# Patient Record
Sex: FEMALE | HISPANIC OR LATINO | Employment: FULL TIME | URBAN - METROPOLITAN AREA
[De-identification: names, ages, dates, MRNs, and addresses within clinical notes are randomized per-mention and may not be internally consistent; named-entity substitution may affect disease eponyms.]

---

## 2017-01-24 ENCOUNTER — HOSPITAL ENCOUNTER (OUTPATIENT)
Dept: RADIOLOGY | Facility: MEDICAL CENTER | Age: 47
End: 2017-01-24
Attending: CLINICAL NURSE SPECIALIST
Payer: COMMERCIAL

## 2017-01-24 DIAGNOSIS — Z12.31 SCREENING MAMMOGRAM, ENCOUNTER FOR: ICD-10-CM

## 2017-01-24 PROCEDURE — G0202 SCR MAMMO BI INCL CAD: HCPCS

## 2018-02-08 ENCOUNTER — OFFICE VISIT (OUTPATIENT)
Dept: INTERNAL MEDICINE | Facility: MEDICAL CENTER | Age: 48
End: 2018-02-08
Payer: COMMERCIAL

## 2018-02-08 VITALS
HEIGHT: 62 IN | DIASTOLIC BLOOD PRESSURE: 100 MMHG | HEART RATE: 67 BPM | TEMPERATURE: 98.1 F | OXYGEN SATURATION: 96 % | SYSTOLIC BLOOD PRESSURE: 157 MMHG | BODY MASS INDEX: 28.52 KG/M2 | WEIGHT: 155 LBS

## 2018-02-08 DIAGNOSIS — R03.0 ELEVATED BLOOD PRESSURE READING: ICD-10-CM

## 2018-02-08 DIAGNOSIS — M25.562 LEFT KNEE PAIN, UNSPECIFIED CHRONICITY: ICD-10-CM

## 2018-02-08 PROCEDURE — 99203 OFFICE O/P NEW LOW 30 MIN: CPT | Mod: GE | Performed by: INTERNAL MEDICINE

## 2018-02-08 ASSESSMENT — PATIENT HEALTH QUESTIONNAIRE - PHQ9: CLINICAL INTERPRETATION OF PHQ2 SCORE: 0

## 2018-02-08 NOTE — PROGRESS NOTES
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 Her chief complaint is a pain in the left knee for more than one month she did not notice any obvious trauma to her knee joint however she endorses to some muscle pull happened during cleaning work.    She does not complain of any swelling of the joints nor any fever, chills. Pain does not radiate down to the extremity rather it goes up to the thighs on the posterior aspect.    She endorses  history of transient hypertension in 2011 and her primary care doctor discontinued the medication as her blood pressure remained normal for several months. However today in the clinic her blood pressure is elevated. She declined any history of headache, blurry vision, weakness of body, syncopal attacks, chest pain, shortness of breath.    She is  and has 4 children lives by herself. She is a nonsmoker and nonalcoholic she does not abuse any drugs    Patient Active Problem List    Diagnosis Date Noted   • Left knee pain 02/08/2018   • Elevated blood pressure reading 02/08/2018   • HTN (hypertension)    • Dyslipidemia        Past Medical History:   Diagnosis Date   • Dyslipidemia    • HTN    • Migraine with visual aura        Current Outpatient Prescriptions   Medication Sig Dispense Refill   • amlodipine (NORVASC) 10 MG TABS Take 1 Tab by mouth every day. 90 Each 1   • ASPIRIN-CAFFEINE-BUTALBITAL (BUTALBITAL COMPOUND/ASA) -40 MG CAPS Take 1 Cap by mouth every four hours as needed for Mild Pain. 25 Each 0   • rizatriptan (MAXALT-MLT) 10 MG disintegrating tablet Take 1 Tab by mouth Once PRN for Migraine for 1 dose. 1 Each 0     No current facility-administered medications for this visit.        Allergies as of 02/08/2018 - Reviewed 02/08/2018   Allergen Reaction  "Noted   • Pcn [penicillins]  06/18/2009       Social History     Social History   • Marital status:      Spouse name: N/A   • Number of children: 4   • Years of education: 9th grade     Occupational History   • House Keeping      Owns houskeeping     Social History Main Topics   • Smoking status: Never Smoker   • Smokeless tobacco: Never Used   • Alcohol use No   • Drug use: No   • Sexual activity: Yes     Partners: Male     Other Topics Concern   • Seat Belt Yes     Social History Narrative   • No narrative on file       Family History   Problem Relation Age of Onset   • Cancer Mother      uterine       Past Surgical History:   Procedure Laterality Date   • CHOLECYSTECTOMY     • TUBAL COAGULATION LAPAROSCOPIC BILATERAL         ROS: As per HPI. Additional pertinent symptoms as noted below.  Constitutional: Negative for fever, chills, positive for malaise/fatigue.   HENT: Negative for ear pain, nosebleeds, congestion, sore throat and neck pain.    Eyes: Negative for blurred vision   Respiratory: Negative for cough, sputum production,and wheezing.    Cardiovascular: Negative for chest pain, palpitations, orthopnea but  positive for leg swelling.   Gastrointestinal: Negative for heartburn, nausea, vomiting and abdominal pain.   Genitourinary: Negative for dysuria,  Musculoskeletal: Negative for myalgias, and back pain. ,  Positive for knee joint pain,  Skin: Negative for rash and itching.   Neurological: Negative for dizziness,  focal weakness and headaches.   Positive for tingling, sensory change in feet,  Endo/Heme/Allergies: Does not bruise/bleed easily.   Psychiatric/Behavioral: Negative for depression, suicidal ideas and memory loss      /100   Pulse 67   Temp 36.7 °C (98.1 °F)   Ht 1.575 m (5' 2\")   Wt 70.3 kg (155 lb)   SpO2 96%   BMI 28.35 kg/m²     Physical Exam  General:  Alert and oriented, No apparent distress.    Eyes: Pupils equal and reactive. No scleral icterus.    Throat: Clear no " erythema or exudates noted.    Neck: Supple. No lymphadenopathy noted. Thyroid not enlarged.    Lungs: Clear to auscultation and percussion bilaterally.    Cardiovascular: Regular rate and rhythm. No murmurs, rubs or gallops.    Abdomen:  Benign. No rebound or guarding noted.    Extremities: No clubbing, cyanosis, edema.    Skin: Clear. No rash or suspicious skin lesions noted.    Other:       Assessment and Plan    1. Left knee pain, unspecified chronicity  History of pain in the left knee joint for more than one month  Relieves on applying Voltaren gel  Patient requesting for orthopedic referral as she has been continuously using Voltaren gel  No obvious swelling or deformity of the knee joint nor any signs of effusion visible  However there is definite tenderness over deep palpation of the tendon of vastus lateralis    Orthopedic referral given    2. Elevated blood pressure reading  Patient's blood pressure was initially 134/90 subsequent recording was 157/100 with a pulse of 86/m  Clinically no signs of target organ damage  No abdominal bruit  No carotid bruit  Pulses normal  Heart sounds normal lung bases clear    Plan   we will obtain CBC CMP lipid profile urine microalbumin creatinine ratio  Patient is advised to keep a blood pressure log of morning evening recordings daily for 2 weeks and   come back and see us if blood pressure remains elevated we will start antihypertensive medications.   Patient was reluctant to start medications today  she assures she will bring the record        Risk Assessment (discuss potential complications a function of chronic problems): as above    Complexity (discuss number of co-morbidities): as above    Signed by: Leeann An M.D.

## 2018-02-14 DIAGNOSIS — M25.562 LEFT KNEE PAIN, UNSPECIFIED CHRONICITY: ICD-10-CM

## 2018-02-14 DIAGNOSIS — R03.0 ELEVATED BLOOD PRESSURE READING: ICD-10-CM

## 2018-02-22 ENCOUNTER — OFFICE VISIT (OUTPATIENT)
Dept: INTERNAL MEDICINE | Facility: MEDICAL CENTER | Age: 48
End: 2018-02-22
Payer: COMMERCIAL

## 2018-02-22 VITALS
BODY MASS INDEX: 28.37 KG/M2 | HEIGHT: 62 IN | SYSTOLIC BLOOD PRESSURE: 158 MMHG | DIASTOLIC BLOOD PRESSURE: 102 MMHG | OXYGEN SATURATION: 94 % | HEART RATE: 60 BPM | WEIGHT: 154.2 LBS | TEMPERATURE: 97.6 F

## 2018-02-22 DIAGNOSIS — B08.1 MOLLUSCUM CONTAGIOSUM: ICD-10-CM

## 2018-02-22 DIAGNOSIS — M25.562 LEFT KNEE PAIN, UNSPECIFIED CHRONICITY: ICD-10-CM

## 2018-02-22 DIAGNOSIS — I10 ESSENTIAL HYPERTENSION: ICD-10-CM

## 2018-02-22 DIAGNOSIS — E78.5 DYSLIPIDEMIA: ICD-10-CM

## 2018-02-22 DIAGNOSIS — E66.3 OVERWEIGHT (BMI 25.0-29.9): ICD-10-CM

## 2018-02-22 PROCEDURE — 99213 OFFICE O/P EST LOW 20 MIN: CPT | Mod: GE | Performed by: INTERNAL MEDICINE

## 2018-02-22 PROCEDURE — 93000 ELECTROCARDIOGRAM COMPLETE: CPT | Performed by: INTERNAL MEDICINE

## 2018-02-22 RX ORDER — IBUPROFEN 200 MG
200 TABLET ORAL EVERY 6 HOURS PRN
COMMUNITY
End: 2018-02-23

## 2018-02-22 RX ORDER — LISINOPRIL 10 MG/1
10 TABLET ORAL DAILY
Qty: 30 TAB | Refills: 1 | Status: SHIPPED | OUTPATIENT
Start: 2018-02-22 | End: 2018-04-17 | Stop reason: SDUPTHER

## 2018-02-22 ASSESSMENT — PAIN SCALES - GENERAL: PAINLEVEL: 4=SLIGHT-MODERATE PAIN

## 2018-02-23 RX ORDER — IMIQUIMOD 12.5 MG/.25G
CREAM TOPICAL
Qty: 1 EACH | Refills: 3 | Status: SHIPPED | OUTPATIENT
Start: 2018-02-23 | End: 2019-03-26

## 2018-02-23 NOTE — PROGRESS NOTES
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    Today she has brought her blood pressure log which is substantially high, which has ranged from 140/92 170/106, and blood pressure in the clinic also is high at 158/102    She had her cortisone shots for her left knee pain by  the orthopedic surgeon, however her pain is still present she was told by the orthopedic surgeon it will take some time after the steroid shots to be active.        Patient Active Problem List    Diagnosis Date Noted   • Molluscum contagiosum 02/22/2018   • Left knee pain 02/08/2018   • Elevated blood pressure reading 02/08/2018   • HTN (hypertension)    • Dyslipidemia        Current Outpatient Prescriptions   Medication Sig Dispense Refill   • ibuprofen (MOTRIN) 200 MG Tab Take 200 mg by mouth every 6 hours as needed.     • lisinopril (PRINIVIL) 10 MG Tab Take 1 Tab by mouth every day. 30 Tab 1   • amlodipine (NORVASC) 10 MG TABS Take 1 Tab by mouth every day. 90 Each 1   • ASPIRIN-CAFFEINE-BUTALBITAL (BUTALBITAL COMPOUND/ASA) -40 MG CAPS Take 1 Cap by mouth every four hours as needed for Mild Pain. 25 Each 0   • rizatriptan (MAXALT-MLT) 10 MG disintegrating tablet Take 1 Tab by mouth Once PRN for Migraine for 1 dose. 1 Each 0     No current facility-administered medications for this visit.        ROS: As per HPI. Additional pertinent  "symptoms as noted below.    Positive for dull aching headache  Negative for blurry vision  Negative for stroke  Negative for syncopal episodes  Negative for chest pain  Negative for shortness of breath  Negative for swelling of extremities      /102   Pulse 60   Temp 36.4 °C (97.6 °F)   Ht 1.575 m (5' 2\")   Wt 69.9 kg (154 lb 3.2 oz)   SpO2 94%   Breastfeeding? No   BMI 28.20 kg/m²     Physical Exam   Constitutional:  oriented to person, place, and time. No distress.   Eyes: Pupils are equal, round, and reactive to light. No scleral icterus.  Neck: Neck supple. No thyromegaly present. No carotid bruit   Cardiovascular: Normal rate, regular rhythm and normal heart sounds.  Exam reveals no gallop and no friction rub.  No murmur heard.  Pulmonary/Chest: Breath sounds normal. Chest wall is not dull to percussion.   Musculoskeletal:   no edema.  Dorsalis pedis pulses normal   No pitting edema  Lymphadenopathy: no cervical adenopathy  Abdomen soft nontender no abdominal bruit heard  Neurological: alert and oriented to person, place, and time.   Skin: No cyanosis. Nails show no clubbing.  Other: Normal mood and affect normal      Assessment and Plan    1. Essential hypertension  Patient's blood pressure consistently higher range  Peripheral pulses normal  No abdominal bruit  No Carotid bruit  Heart and lung exam normal  Funduscopy no significant retinopathy changes  EKG shows;  Normal sinus rhythm;   heart rate 62  MN interval normal  QRS duration normal  QTC normal  No evidence of left ventricular hypertrophy  No ischemic changes  No Q waves    Lab work done at [facility] on 2/12/2018 shows;     Random urine excretion of microalbumin 125.4 and random creatinine 133 mg per DL  microalbumin creatinine ratio very high at 943  Chem panel shows   BUN/creatinine 22/0.76 GFR 93,   sodium 135, potassium 4.6, chlorides 102, carbon dioxide 26,   calcium 7.1,   total protein 7.1, albumin 4.1, globulin 3.0, " albumin globulin ratio 1.4, bilirubin total 0.5, alkaline phosphatase 60, AST 58, ALT 39  WBC count 7.4K , hemoglobin and hematocrit 15.9/50.4    Plan  Start with lisinopril 10 mg, ( although she is in reproductive age group, she has had tubal ligation, has 4 kids, and not planning for any conception )  Patient advised to keep blood pressure recordings after starting the treatment and bring it in 2 weeks  She is advised to cut down her diet,    Follow the DASH diet   reduce her weight and make lifestyle modifications    2. Dyslipidemia    Lab Results   Component Value Date/Time    CHOLSTRLTOT 220 (H) 11/08/2011 08:00 AM     11/08/2011 08:00 AM    HDL 48 11/08/2011 08:00 AM    TRIGLYCERIDE 65 11/08/2011 08:00 AM       Lab Results   Component Value Date/Time    GLUCOSE 87 11/08/2011 08:00 AM    CREATININE 0.64 11/08/2011 08:00 AM     No results found for: ALKPHOSPHAT, ASTSGOT, ALTSGPT, TBILIRUBIN       Repeat lipid panel done at [facility] on 2/12/2018  Shows total cholesterol 225, HDL 60, triglycerides 103,     Plan  Patient advised to control her diet and do regular exercise and weight reduction  Lifestyle modification  Patient verbalizes understanding and will try first by diet control before she agrees for starting Statin therapy    3. Molluscum contagiosum  Patient has a skin lesion over the left infra-clavicular region which appears to be molluscum contagiosum which has been chronically irritated by her Bra . She had consulted her dermatologist who advised to follow-up with her PCP if the lesion do not go away.    Plan  Will give trial of Aldara cream (Imiquimod 5 %)  If no response will consider doing liquid nitrogen freeze cautery    4. 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 She has received cortisone shot by her orthopedic surgeon.  Anticipating pain relief.    5. Overweight (BMI  25.0-29.9)  Patient weight is in overweight category  Nutritional referral  given, as patient is interested in reducing her weight and control her cholesterol and also DASH diet for hypertension      Signed by: Leenan An M.D.

## 2018-02-23 NOTE — PATIENT INSTRUCTIONS
"DASH Eating Plan  DASH stands for \"Dietary Approaches to Stop Hypertension.\" The DASH eating plan is a healthy eating plan that has been shown to reduce high blood pressure (hypertension). Additional health benefits may include reducing the risk of type 2 diabetes mellitus, heart disease, and stroke. The DASH eating plan may also help with weight loss.  WHAT DO I NEED TO KNOW ABOUT THE DASH EATING PLAN?  For the DASH eating plan, you will follow these general guidelines:  · Choose foods with a percent daily value for sodium of less than 5% (as listed on the food label).  · Use salt-free seasonings or herbs instead of table salt or sea salt.  · Check with your health care provider or pharmacist before using salt substitutes.  · Eat lower-sodium products, often labeled as \"lower sodium\" or \"no salt added.\"  · Eat fresh foods.  · Eat more vegetables, fruits, and low-fat dairy products.  · Choose whole grains. Look for the word \"whole\" as the first word in the ingredient list.  · Choose fish and skinless chicken or turkey more often than red meat. Limit fish, poultry, and meat to 6 oz (170 g) each day.  · Limit sweets, desserts, sugars, and sugary drinks.  · Choose heart-healthy fats.  · Limit cheese to 1 oz (28 g) per day.  · Eat more home-cooked food and less restaurant, buffet, and fast food.  · Limit fried foods.  · Cook foods using methods other than frying.  · Limit canned vegetables. If you do use them, rinse them well to decrease the sodium.  · When eating at a restaurant, ask that your food be prepared with less salt, or no salt if possible.  WHAT FOODS CAN I EAT?  Seek help from a dietitian for individual calorie needs.  Grains  Whole grain or whole wheat bread. Brown rice. Whole grain or whole wheat pasta. Quinoa, bulgur, and whole grain cereals. Low-sodium cereals. Corn or whole wheat flour tortillas. Whole grain cornbread. Whole grain crackers. Low-sodium crackers.  Vegetables  Fresh or frozen vegetables " (raw, steamed, roasted, or grilled). Low-sodium or reduced-sodium tomato and vegetable juices. Low-sodium or reduced-sodium tomato sauce and paste. Low-sodium or reduced-sodium canned vegetables.   Fruits  All fresh, canned (in natural juice), or frozen fruits.  Meat and Other Protein Products  Ground beef (85% or leaner), grass-fed beef, or beef trimmed of fat. Skinless chicken or turkey. Ground chicken or turkey. Pork trimmed of fat. All fish and seafood. Eggs. Dried beans, peas, or lentils. Unsalted nuts and seeds. Unsalted canned beans.  Dairy  Low-fat dairy products, such as skim or 1% milk, 2% or reduced-fat cheeses, low-fat ricotta or cottage cheese, or plain low-fat yogurt. Low-sodium or reduced-sodium cheeses.  Fats and Oils  Tub margarines without trans fats. Light or reduced-fat mayonnaise and salad dressings (reduced sodium). Avocado. Safflower, olive, or canola oils. Natural peanut or almond butter.  Other  Unsalted popcorn and pretzels.  The items listed above may not be a complete list of recommended foods or beverages. Contact your dietitian for more options.  WHAT FOODS ARE NOT RECOMMENDED?  Grains  White bread. White pasta. White rice. Refined cornbread. Bagels and croissants. Crackers that contain trans fat.  Vegetables  Creamed or fried vegetables. Vegetables in a cheese sauce. Regular canned vegetables. Regular canned tomato sauce and paste. Regular tomato and vegetable juices.  Fruits  Dried fruits. Canned fruit in light or heavy syrup. Fruit juice.  Meat and Other Protein Products  Fatty cuts of meat. Ribs, chicken wings, powers, sausage, bologna, salami, chitterlings, fatback, hot dogs, bratwurst, and packaged luncheon meats. Salted nuts and seeds. Canned beans with salt.  Dairy  Whole or 2% milk, cream, half-and-half, and cream cheese. Whole-fat or sweetened yogurt. Full-fat cheeses or blue cheese. Nondairy creamers and whipped toppings. Processed cheese, cheese spreads, or cheese  "curds.  Condiments  Onion and garlic salt, seasoned salt, table salt, and sea salt. Canned and packaged gravies. Worcestershire sauce. Tartar sauce. Barbecue sauce. Teriyaki sauce. Soy sauce, including reduced sodium. Steak sauce. Fish sauce. Oyster sauce. Cocktail sauce. Horseradish. Ketchup and mustard. Meat flavorings and tenderizers. Bouillon cubes. Hot sauce. Tabasco sauce. Marinades. Taco seasonings. Relishes.  Fats and Oils  Butter, stick margarine, lard, shortening, ghee, and powers fat. Coconut, palm kernel, or palm oils. Regular salad dressings.  Other  Pickles and olives. Salted popcorn and pretzels.  The items listed above may not be a complete list of foods and beverages to avoid. Contact your dietitian for more information.  WHERE CAN I FIND MORE INFORMATION?  National Heart, Lung, and Blood [place]: www.nhlbi.nih.gov/health/health-topics/topics/dash/     This information is not intended to replace advice given to you by your health care provider. Make sure you discuss any questions you have with your health care provider.     Document Released: 12/06/2012 Document Revised: 01/08/2016 Document Reviewed: 10/22/2014  [employer] Interactive Patient Education ©2016 [employer]vier Inc.    Plan de alimentación DASH  (DASH Eating Plan)  DASH es la sigla en inglés de \"Enfoques Alimentarios para Detener la Hipertensión\". El plan de alimentación DASH ha demostrado bajar la presión arterial elevada (hipertensión). Los beneficios adicionales para la manuel pueden incluir la disminución del riesgo de diabetes mellitus tipo 2, enfermedades cardíacas e ictus. Michelle plan también puede ayudar a adelgazar.  ¿QUÉ MARINA SABER ACERCA DEL PLAN DE ALIMENTACIÓN DASH?  Para el plan de alimentación DASH, seguirá las siguientes pautas generales:  · Elija los alimentos con un valor porcentual diario de sodio de menos del 5 % (según figura en la etiqueta del alimento).  · Use hierbas o aderezos sin sal, en lugar de sal de hewitt o sal " "christ.  · Consulte al médico o farmacéutico antes de usar sustitutos de la sal.  · Coma productos con bajo contenido de sodio, cuya etiqueta suele decir \"bajo contenido de sodio\" o \"sin agregado de sal\".  · Coma alimentos frescos.  · Coma más verduras, frutas y productos lácteos con bajo contenido de grasas.  · Elija los cereales integrales. Busque la palabra \"integral\" en el primer lugar de la lista de ingredientes.  · Elija el pescado y el angelito o el pavo sin piel más a menudo que las lamberto strong. Limite el consumo de pescado, carne de ave y carne a 6 onzas (170 g) por día.  · Limite el consumo de dulces, postres, azúcares y bebidas azucaradas.  · Elija las grasas saludables para el corazón.  · Limite el consumo de queso a 1 onza (28 g) por día.  · Consuma más comida casera y menos de restaurante, de bufé y comida rápida.  · Limite el consumo de alimentos fritos.  · Cocine los alimentos utilizando métodos que no hector la fritura.  · Limite las verduras enlatadas. Si las consume, enjuáguelas anushka para disminuir el sodio.  · Cuando coma en un restaurante, pida que preparen walker comida con menos sal o, en lo posible, sin nada de sal.  ¿QUÉ ALIMENTOS PUEDO COMER?  Pida ayuda a un nutricionista para conocer las necesidades calóricas individuales.  Cereales  Pan de salvado o integral. Arroz integral. Pastas de salvado o integrales. Quinua, abisai burgol y cereales integrales. Cereales con bajo contenido de sodio. Tortillas de harina de maíz o de salvado. Pan de maíz integral. Galletas saladas integrales. Galletas con bajo contenido de sodio.  Vegetales  Verduras frescas o congeladas (crudas, al vapor, asadas o grilladas). Jugos de tomate y verduras con contenido bajo o reducido de sodio. Pasta y salsa de tomate con contenido bajo o reducido de sodio. Verduras enlatadas con bajo contenido de sodio o reducido de sodio.   Frutas  Frutas frescas, en conserva (en walker jugo natural) o frutas congeladas.  Lamberto y otros productos " con proteínas  Carne de res molida (al 85 % o más magra), carne de res de animales alimentados con pastos o carne de res sin la grasa. Wes o pavo sin piel. Carne de wes o de pavo molida. Cerdo sin la grasa. Todos los pescados y harlan de mar. Huevos. Porotos, guisantes o lentejas secos. Harlan secos y semillas sin sal. Frijoles enlatados sin sal.  Lácteos  Productos lácteos con bajo contenido de grasas, quinn leche descremada o al 1 %, quesos reducidos en grasas o al 2 %, ricota con bajo contenido de grasas o queso cottage, o yogur natural con bajo contenido de grasas. Quesos con contenido bajo o reducido de sodio.  Grasas y aceites  Margarinas en seema que no contengan grasas trans. Mayonesa y aliños para ensaladas livianos o reducidos en grasas (reducidos en sodio). [place]ac[place]. Aceites de cártamo, jovel o canola. Mantequilla natural de maní o augustus.  Otros  Palomitas de maíz y pretzels sin sal.  Los artículos mencionados arriba pueden no ser ginette lista completa de las bebidas o los alimentos recomendados. Comuníquese con el nutricionista para conocer más opciones.  ¿QUÉ ALIMENTOS NO SE RECOMIENDAN?  Cereales  Pan reynolds. Pastas zara. Arroz reynolds. Pan de maíz refinado. Bagels y croissants. Galletas saladas que contengan grasas trans.  Vegetales  Vegetales con crema o fritos. Verduras en salsa de queso. Verduras enlatadas comunes. Pasta y salsa de tomate en sruthi comunes. Jugos comunes de tomate y de verduras.  Frutas  Frutas secas. Fruta enlatada en almíbar liviano o espeso. Jugo de frutas.  Lamberto y otros productos con proteínas  Galarza de carne con grasa. Costillas, hines de wes, tocineta, salchicha, mortadela, robel, chinchulines, tocino, perros calientes, salchichas alemanas y embutidos envasados. Harlan secos y semillas con sal. Frijoles con sal en sruthi.  Lácteos  Leche entera o al 2 %, crema, mezcla de leche y crema, y queso crema. Yogur entero o endulzado. Quesos o queso lucila con alto contenido de  grasas. Cremas no lácteas y coberturas batidas. Quesos procesados, quesos para untar o cuajadas.  Condimentos  Sal de cebolla y ajo, sal condimentada, sal de hewitt y sal christ. Salsas en sruthi y envasadas. Salsa Worcestershire. Salsa tártara. Salsa barbacoa. Salsa teriyaki. Salsa de soja, incluso la que tiene contenido reducido de sodio. Salsa de carne. Salsa de pescado. Salsa de ostras. Salsa rosada. Rábano picante. Ketchup y mostaza. Saborizantes y tiernizantes para carne. Caldo en cubitos. Salsa picante. Salsa tabasco. Adobos. Aderezos para tacos. Salsas.  Grasas y aceites  Mantequilla, margarina en seema, manteca de cerdo, grasa, mantequilla clarificada y grasa de tocino. Aceites de terell, de palmiste o de disla. Aderezos comunes para ensalada.  Otros  Pickles y aceitunas. Palomitas de maíz y pretzels con sal.  Los artículos mencionados arriba pueden no ser ginette lista completa de las bebidas y los alimentos que se deben evitar. Comuníquese con el nutricionista para obtener más información.  ¿DÓNDE PUEDO ENCONTRAR MÁS INFORMACIÓN?  Instituto Nacional del [place], del [place] y de la [place] (National Heart, Lung, and Blood [place]): www.nhlbi.nih.gov/health/health-topics/topics/dash/     Esta información no tiene quinn fin reemplazar el consejo del médico. Asegúrese de hacerle al médico cualquier pregunta que tenga.     Document Released: 12/06/2012 Document Revised: 01/08/2016  [employer] Interactive Patient Education ©2016 [employer] Inc.

## 2018-04-11 ENCOUNTER — HOSPITAL ENCOUNTER (OUTPATIENT)
Dept: RADIOLOGY | Facility: MEDICAL CENTER | Age: 48
End: 2018-04-11
Attending: OBSTETRICS & GYNECOLOGY
Payer: COMMERCIAL

## 2018-04-11 DIAGNOSIS — Z12.31 ENCOUNTER FOR SCREENING MAMMOGRAM FOR MALIGNANT NEOPLASM OF BREAST: ICD-10-CM

## 2018-04-11 PROCEDURE — 77067 SCR MAMMO BI INCL CAD: CPT

## 2018-04-17 ENCOUNTER — OFFICE VISIT (OUTPATIENT)
Dept: INTERNAL MEDICINE | Facility: MEDICAL CENTER | Age: 48
End: 2018-04-17
Payer: COMMERCIAL

## 2018-04-17 VITALS
DIASTOLIC BLOOD PRESSURE: 78 MMHG | OXYGEN SATURATION: 98 % | BODY MASS INDEX: 28.37 KG/M2 | WEIGHT: 154.2 LBS | SYSTOLIC BLOOD PRESSURE: 118 MMHG | TEMPERATURE: 98.3 F | HEIGHT: 62 IN | HEART RATE: 57 BPM

## 2018-04-17 DIAGNOSIS — E78.5 DYSLIPIDEMIA: ICD-10-CM

## 2018-04-17 DIAGNOSIS — I10 HYPERTENSION, UNSPECIFIED TYPE: ICD-10-CM

## 2018-04-17 DIAGNOSIS — Z91.09 ENVIRONMENTAL ALLERGIES: ICD-10-CM

## 2018-04-17 DIAGNOSIS — I10 ESSENTIAL HYPERTENSION: ICD-10-CM

## 2018-04-17 PROCEDURE — 99213 OFFICE O/P EST LOW 20 MIN: CPT | Mod: GE | Performed by: INTERNAL MEDICINE

## 2018-04-17 RX ORDER — LISINOPRIL 10 MG/1
10 TABLET ORAL DAILY
Qty: 90 TAB | Refills: 1 | Status: SHIPPED | OUTPATIENT
Start: 2018-04-17 | End: 2019-02-19 | Stop reason: SDUPTHER

## 2018-04-17 RX ORDER — CETIRIZINE HYDROCHLORIDE 10 MG/1
10 TABLET ORAL DAILY
Qty: 30 TAB | Refills: 3 | Status: SHIPPED | OUTPATIENT
Start: 2018-04-17 | End: 2019-03-26

## 2018-04-17 ASSESSMENT — PAIN SCALES - GENERAL: PAINLEVEL: NO PAIN

## 2018-04-17 NOTE — PROGRESS NOTES
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      Today she has brought her blood pressure log which is substantially well controlled in ranges of 110/70- 130/80, she is feeling overall great and good energy level back to her housekeeping work.     However she reports severe environmental allergies to dogs and cat hair and pollutants while cleaning, albeit declined any shortness of breath, headaches, fever, conjunctival itching.    She had her mammogram which was normal and also had Pap smear with her OB/GYN which was normal           Patient Active Problem List    Diagnosis Date Noted   • Environmental allergies 04/17/2018   • Molluscum contagiosum 02/22/2018   • Left knee pain 02/08/2018   • Elevated blood pressure reading 02/08/2018   • HTN (hypertension)    • Dyslipidemia        Current Outpatient Prescriptions   Medication Sig Dispense Refill   • cetirizine (ZYRTEC) 10 MG Tab Take 1 Tab by mouth every day. 30 Tab 3   • lisinopril (PRINIVIL) 10 MG Tab Take 1 Tab by mouth every day. 90 Tab 1   • imiquimod (ALDARA) 5 % cream Apply twice daily 1 Each 3     No current facility-administered medications for this visit.        ROS: As per HPI. Additional pertinent symptoms as noted below.        /78   Pulse (!) 57   Temp 36.8 °C (98.3 °F)   Ht 1.575 m (5' 2\")   Wt 69.9 kg (154 lb 3.2 oz)   LMP 04/15/2018   SpO2 98%   Breastfeeding? No   BMI 28.20 kg/m²     Physical Exam   Constitutional:  oriented to person, " place, and time. No distress.   Eyes: Pupils are equal, round, and reactive to light. No scleral icterus.  Neck: Neck supple. No thyromegaly present. No bruit heard  Cardiovascular: Normal rate, regular rhythm and normal heart sounds.  Exam reveals no gallop and no friction rub.  No murmur heard.  Pulmonary/Chest: Breath sounds normal. Chest wall is not dull to percussion.   Musculoskeletal:   no pedal edema.   Lymphadenopathy: no cervical adenopathy  Neurological: alert and oriented to person, place, and time., No Focal deficits   Skin: No cyanosis. Nails show no clubbing.  Other:       Assessment and Plan    1. Hypertension, unspecified type  Blood pressure well controlled with lisinopril 10 mg  No signs of end organ damage  Will continue the same    2. Dyslipidemia  Total cholesterol 220 in 11/20/11, increased to  225 in Feb 2018   in 2011, reduced to 143 in March 2018  HDL 60,   Low ASCVD score due to age  Patient is motivated to continue diet and regular exercise,       3. Environmental allergies  History of severe sneezing after exposure to dust while cleaning  No shortness of breath or fever or conjunctival injection  Chest clear to auscultation, no signs of hay fever  Advised to avoid allergens as much as possible using a e protective equipments and mask  Tablet Zyrtec 10 mg at bedtime  Avoid using in day and avoid driving if sedation      4. Health maintenance  Patient had her Pap smear last month which was normal  Mammogram in 4/2018 normal      Signed by: Leeann An M.D.

## 2018-04-17 NOTE — PATIENT INSTRUCTIONS

## 2019-02-19 DIAGNOSIS — I10 ESSENTIAL HYPERTENSION: ICD-10-CM

## 2019-02-19 NOTE — TELEPHONE ENCOUNTER
Was the patient seen in the last year in this department? Yes  Last seen: 04/17/18 by Dr. An  Next appt: NONE      Does patient have an active prescription for medications requested? No     Received Request Via: Pharmacy

## 2019-02-25 RX ORDER — LISINOPRIL 10 MG/1
10 TABLET ORAL DAILY
Qty: 90 TAB | Refills: 0 | Status: SHIPPED | OUTPATIENT
Start: 2019-02-25 | End: 2019-03-25 | Stop reason: SDUPTHER

## 2019-03-25 ENCOUNTER — OFFICE VISIT (OUTPATIENT)
Dept: INTERNAL MEDICINE | Facility: MEDICAL CENTER | Age: 49
End: 2019-03-25
Payer: COMMERCIAL

## 2019-03-25 VITALS
BODY MASS INDEX: 28.19 KG/M2 | OXYGEN SATURATION: 95 % | WEIGHT: 153.2 LBS | SYSTOLIC BLOOD PRESSURE: 110 MMHG | HEART RATE: 55 BPM | HEIGHT: 62 IN | DIASTOLIC BLOOD PRESSURE: 80 MMHG | TEMPERATURE: 97.5 F

## 2019-03-25 DIAGNOSIS — I10 ESSENTIAL HYPERTENSION: ICD-10-CM

## 2019-03-25 DIAGNOSIS — S83.241D ACUTE MEDIAL MENISCUS TEAR OF RIGHT KNEE, SUBSEQUENT ENCOUNTER: ICD-10-CM

## 2019-03-25 PROBLEM — S83.249A ACUTE MEDIAL MENISCAL TEAR: Status: ACTIVE | Noted: 2019-03-25

## 2019-03-25 PROCEDURE — 99213 OFFICE O/P EST LOW 20 MIN: CPT | Mod: GE | Performed by: INTERNAL MEDICINE

## 2019-03-25 RX ORDER — LISINOPRIL 10 MG/1
10 TABLET ORAL DAILY
Qty: 90 TAB | Refills: 3 | Status: SHIPPED | OUTPATIENT
Start: 2019-03-25 | End: 2020-08-18 | Stop reason: SDUPTHER

## 2019-03-25 ASSESSMENT — PATIENT HEALTH QUESTIONNAIRE - PHQ9: CLINICAL INTERPRETATION OF PHQ2 SCORE: 0

## 2019-03-25 NOTE — PROGRESS NOTES
Established Patient    Karely presents today with the following:    CC:   Follow-up for right knee medial meniscal tear    HPI:   48-year-old female with a past medical history of hypertension dyslipidemia presented to the clinic for orthopedic referral.  On 2/28/2019, the patient slipped on ice on her driveway and landed on her knee with a medial meniscal tear resulting in immediate joint swelling, pain, clicking, and warmth.  She was evaluated by [facility] and imaging was suggestive of medial meniscal tear.  She was recommended RICE however PT has been withheld due to consideration for surgery. She is scheduled to see [facility] in 4 days. Taking ibuprofen up to 600 mg a day for pain.     Patient Active Problem List    Diagnosis Date Noted   • Acute medial meniscal tear 03/25/2019   • Environmental allergies 04/17/2018   • Molluscum contagiosum 02/22/2018   • Left knee pain 02/08/2018   • Elevated blood pressure reading 02/08/2018   • HTN (hypertension)    • Dyslipidemia        Current Outpatient Prescriptions   Medication Sig Dispense Refill   • lisinopril (PRINIVIL) 10 MG Tab Take 1 Tab by mouth every day. 90 Tab 3   • cetirizine (ZYRTEC) 10 MG Tab Take 1 Tab by mouth every day. 30 Tab 3   • imiquimod (ALDARA) 5 % cream Apply twice daily 1 Each 3     No current facility-administered medications for this visit.        ROS: As per HPI. Additional pertinent symptoms as noted below.    All others reviewed and negative    Family History   Problem Relation Age of Onset   • Cancer Mother         uterine     Social History   Substance Use Topics   • Smoking status: Never Smoker   • Smokeless tobacco: Never Used   • Alcohol use No     Past Surgical History:   Procedure Laterality Date   • CHOLECYSTECTOMY     • TUBAL COAGULATION LAPAROSCOPIC BILATERAL       Allergies: Pcn [penicillins]    /80 (BP Location: Left arm, Patient Position: Sitting, BP Cuff Size: Adult)   Pulse (!) 55   Temp 36.4 °C (97.5 °F) (Temporal)   " Ht 1.575 m (5' 2\")   Wt 69.5 kg (153 lb 3.2 oz)   SpO2 95%   BMI 28.02 kg/m²     Physical Exam   Constitutional:  oriented to person, place, and time. No distress.   Eyes: Pupils are equal, round, and reactive to light. No scleral icterus.  Neck: Neck supple. No thyromegaly present.   Cardiovascular: Normal rate, regular rhythm and normal heart sounds.  Exam reveals no gallop and no friction rub.  No murmur heard.  Pulmonary/Chest: Breath sounds normal. Chest wall is not dull to percussion.   Musculoskeletal: Right knee braced.   Lymphadenopathy: no cervical adenopathy  Neurological: alert and oriented to person, place, and time.   Skin: No cyanosis. Nails show no clubbing.        Assessment and Plan    1. Essential hypertension  - BP normotensive.   - Continue lisinopril. Refills provided    2. Acute medial meniscus tear of right knee, subsequent encounter  - symptoms of persistent knee pain and locking with certain movements  - [facility] considering surgery  - Orthopedic referral provided to the patient  - Ibuprofen for pain.       Return in about 6 months (around 9/25/2019).   Signed by: Brad Aguilar M.D.  "

## 2019-06-03 ENCOUNTER — HOSPITAL ENCOUNTER (OUTPATIENT)
Facility: MEDICAL CENTER | Age: 49
End: 2019-06-03
Attending: ORTHOPAEDIC SURGERY | Admitting: ORTHOPAEDIC SURGERY
Payer: COMMERCIAL

## 2019-06-06 VITALS — BODY MASS INDEX: 28.4 KG/M2 | WEIGHT: 154.32 LBS | HEIGHT: 62 IN

## 2019-06-06 DIAGNOSIS — Z01.810 PRE-OPERATIVE CARDIOVASCULAR EXAMINATION: ICD-10-CM

## 2019-06-06 DIAGNOSIS — Z01.812 PRE-OPERATIVE LABORATORY EXAMINATION: ICD-10-CM

## 2019-06-06 LAB
ANION GAP SERPL CALC-SCNC: 7 MMOL/L (ref 0–11.9)
BUN SERPL-MCNC: 20 MG/DL (ref 8–22)
CALCIUM SERPL-MCNC: 9.6 MG/DL (ref 8.5–10.5)
CHLORIDE SERPL-SCNC: 104 MMOL/L (ref 96–112)
CO2 SERPL-SCNC: 25 MMOL/L (ref 20–33)
CREAT SERPL-MCNC: 0.71 MG/DL (ref 0.5–1.4)
EKG IMPRESSION: NORMAL
GLUCOSE SERPL-MCNC: 94 MG/DL (ref 65–99)
POTASSIUM SERPL-SCNC: 4.3 MMOL/L (ref 3.6–5.5)
SODIUM SERPL-SCNC: 136 MMOL/L (ref 135–145)

## 2019-06-06 PROCEDURE — 93010 ELECTROCARDIOGRAM REPORT: CPT | Performed by: INTERNAL MEDICINE

## 2019-06-06 PROCEDURE — 93005 ELECTROCARDIOGRAM TRACING: CPT

## 2019-06-06 PROCEDURE — 80048 BASIC METABOLIC PNL TOTAL CA: CPT

## 2019-06-06 PROCEDURE — 36415 COLL VENOUS BLD VENIPUNCTURE: CPT

## 2019-06-06 NOTE — OR NURSING
"Pre-admit appointment completed. \"Preparing for your procedure\" sheet given to pt along with verbal and written instructions. Pt instructed to continue regularly prescribed medications through the day before surgery.   "

## 2019-06-27 ENCOUNTER — HOSPITAL ENCOUNTER (OUTPATIENT)
Dept: RADIOLOGY | Facility: MEDICAL CENTER | Age: 49
End: 2019-06-27
Attending: ORTHOPAEDIC SURGERY
Payer: COMMERCIAL

## 2019-06-27 DIAGNOSIS — M79.661 RIGHT CALF PAIN: ICD-10-CM

## 2019-06-27 PROCEDURE — 93971 EXTREMITY STUDY: CPT | Mod: RT

## 2020-01-15 ENCOUNTER — HOSPITAL ENCOUNTER (OUTPATIENT)
Dept: RADIOLOGY | Facility: MEDICAL CENTER | Age: 50
End: 2020-01-15
Attending: OBSTETRICS & GYNECOLOGY
Payer: COMMERCIAL

## 2020-01-15 DIAGNOSIS — N64.4 BREAST PAIN: ICD-10-CM

## 2020-01-15 PROCEDURE — G0279 TOMOSYNTHESIS, MAMMO: HCPCS

## 2020-06-29 ENCOUNTER — TELEPHONE (OUTPATIENT)
Dept: SCHEDULING | Facility: IMAGING CENTER | Age: 50
End: 2020-06-29

## 2020-08-18 ENCOUNTER — OFFICE VISIT (OUTPATIENT)
Dept: MEDICAL GROUP | Facility: MEDICAL CENTER | Age: 50
End: 2020-08-18
Payer: COMMERCIAL

## 2020-08-18 VITALS
OXYGEN SATURATION: 94 % | BODY MASS INDEX: 29.77 KG/M2 | DIASTOLIC BLOOD PRESSURE: 94 MMHG | TEMPERATURE: 98 F | RESPIRATION RATE: 18 BRPM | HEIGHT: 62 IN | HEART RATE: 54 BPM | WEIGHT: 161.8 LBS | SYSTOLIC BLOOD PRESSURE: 154 MMHG

## 2020-08-18 DIAGNOSIS — I10 ESSENTIAL HYPERTENSION: ICD-10-CM

## 2020-08-18 DIAGNOSIS — G89.29 CHRONIC PAIN OF LEFT KNEE: ICD-10-CM

## 2020-08-18 DIAGNOSIS — Z13.21 ENCOUNTER FOR VITAMIN DEFICIENCY SCREENING: ICD-10-CM

## 2020-08-18 DIAGNOSIS — Z23 NEED FOR VACCINATION: ICD-10-CM

## 2020-08-18 DIAGNOSIS — M25.562 CHRONIC PAIN OF LEFT KNEE: ICD-10-CM

## 2020-08-18 DIAGNOSIS — E78.5 DYSLIPIDEMIA: ICD-10-CM

## 2020-08-18 DIAGNOSIS — Z11.3 SCREEN FOR STD (SEXUALLY TRANSMITTED DISEASE): ICD-10-CM

## 2020-08-18 PROBLEM — B08.1 MOLLUSCUM CONTAGIOSUM: Status: RESOLVED | Noted: 2018-02-22 | Resolved: 2020-08-18

## 2020-08-18 PROBLEM — R03.0 ELEVATED BLOOD PRESSURE READING: Status: RESOLVED | Noted: 2018-02-08 | Resolved: 2020-08-18

## 2020-08-18 PROBLEM — S83.249A ACUTE MEDIAL MENISCAL TEAR: Status: RESOLVED | Noted: 2019-03-25 | Resolved: 2020-08-18

## 2020-08-18 PROCEDURE — 90471 IMMUNIZATION ADMIN: CPT | Performed by: FAMILY MEDICINE

## 2020-08-18 PROCEDURE — 99204 OFFICE O/P NEW MOD 45 MIN: CPT | Mod: 25 | Performed by: FAMILY MEDICINE

## 2020-08-18 PROCEDURE — 90715 TDAP VACCINE 7 YRS/> IM: CPT | Performed by: FAMILY MEDICINE

## 2020-08-18 RX ORDER — LISINOPRIL 10 MG/1
10 TABLET ORAL DAILY
Qty: 90 TAB | Refills: 3 | Status: SHIPPED | OUTPATIENT
Start: 2020-08-18 | End: 2021-08-24

## 2020-08-18 ASSESSMENT — PATIENT HEALTH QUESTIONNAIRE - PHQ9: CLINICAL INTERPRETATION OF PHQ2 SCORE: 0

## 2020-08-18 NOTE — ASSESSMENT & PLAN NOTE
This is a chronic problem.  The patient was previously on lisinopril 10 mg daily however ran out about a month and a half ago.  She reports her blood pressure was well controlled on lisinopril. The patient denies chest pain, shortness of breath, headaches, vision changes, lightheadedness, dizziness, or medication side effects.

## 2020-08-18 NOTE — PROGRESS NOTES
Subjective:     CC:  Diagnoses of Chronic pain of left knee, Essential hypertension, Dyslipidemia, Encounter for vitamin deficiency screening, Screen for STD (sexually transmitted disease), and Need for vaccination were pertinent to this visit.    HISTORY OF THE PRESENT ILLNESS: Patient is a 49 y.o. female. She is here today to establish care.  She owns a [employer] business, is , and has 4 children and several grandchildren.  She reports she is up-to-date on her Pap smear-she follows with Dr. Cox.  She will be due for colonoscopy this coming year.  She is up-to-date on her mammogram.      Left knee pain  This is a chronic problem. The patient was previously established with Dr. Tam at [facility] and requests a referral back to Dr. Tam today.  She reports she has had x-rays and completed physical therapy through [facility].  She continues to have intermittent left knee pain.    HTN (hypertension)  This is a chronic problem.  The patient was previously on lisinopril 10 mg daily however ran out about a month and a half ago.  She reports her blood pressure was well controlled on lisinopril. The patient denies chest pain, shortness of breath, headaches, vision changes, lightheadedness, dizziness, or medication side effects.      Dyslipidemia  This is a chronic problem.  The patient is currently managing with a healthy diet.  She tries to avoid red meat and eats mostly vegetables, fish, and chicken.  She exercises at least 5 times a week with [place] theory and/or running.  She denies family history of early heart attack or stroke.    Lab Results   Component Value Date/Time    CHOLSTRLTOT 220 (H) 11/08/2011 08:00 AM     11/08/2011 08:00 AM    HDL 48 11/08/2011 08:00 AM    TRIGLYCERIDE 65 11/08/2011 08:00 AM             Allergies: Latex and Pcn [penicillins]    Current Outpatient Medications Ordered in Epic   Medication Sig Dispense Refill   • lisinopril (PRINIVIL) 10 MG Tab Take 1 Tab by mouth every day.  "90 Tab 3     No current Epic-ordered facility-administered medications on file.        Past Medical History:   Diagnosis Date   • Anesthesia     had some anxiety with rachel surgery   • Arthritis    • Dental disorder     upper and lower partials   • Dyslipidemia    • High cholesterol     Had lost some and improved with no meds.   • HTN    • Migraine with visual aura    • Right knee pain 06/06/2019    mild and unable to bend completely.        Past Surgical History:   Procedure Laterality Date   • ENDOMETRIAL ABLATION  2017   • RACHEL BY LAPAROSCOPY  2005   • TUBAL COAGULATION LAPAROSCOPIC BILATERAL Bilateral 1995       Social History     Tobacco Use   • Smoking status: Never Smoker   • Smokeless tobacco: Never Used   Substance Use Topics   • Alcohol use: No   • Drug use: No       Social History     Social History Narrative   • Not on file       Family History   Problem Relation Age of Onset   • Cancer Mother         uterine       Health Maintenance: see above    ROS:   Gen: no fevers/chills, no changes in weight  Eyes: no changes in vision  ENT: no sore throat or nasal congestion  Pulm: no sob, no cough  CV: no chest pain  GI: no nausea/vomiting, no diarrhea or constipation  : no dysuria  MSk: left knee pain  Skin: no rash  Neuro: no headaches, no numbness/tingling  Immuno: no seasonal allergies  Heme/Lymph: no easy bruising  Psych: no anxiety of depression      Objective:       Exam: /94 (BP Location: Left arm, Patient Position: Sitting, BP Cuff Size: Adult)   Pulse (!) 54   Temp 36.7 °C (98 °F) (Temporal)   Resp 18   Ht 1.575 m (5' 2\")   Wt 73.4 kg (161 lb 12.8 oz)   SpO2 94%  Body mass index is 29.59 kg/m².    General: Well-developed, well-nourished. Appears stated age.  Eyes: Normocephalic. Conjunctiva clear without injection or scleral icterus. Pupils equal and reactive to light.   HENT: Normocephalic, atraumatic. Ears normal shape and contour, canals are clear bilaterally, tympanic membranes " pearly, oropharynx is clear without erythema, edema or exudates.   Neck: Supple; no obvious thyromegaly or nodules appreciated  Pulmonary: Clear to ausculation bilaterally.  No increased work of breathing. No rales, ronchi, or wheezing.  Cardiovascular: Regular rate and rhythm without murmur.  Abdomen: Soft, nontender, nondistended. Normal bowel sounds. No guarding or rebound tenderness.  Neurologic: CN III-XII intact.  Lymph: No cervical or supraclavicular lymphadenopathy palpated.  Skin: Warm and dry.  No obvious lesions.  Musculoskeletal: Normal gait.  Psych: Euthymic affect. Alert and oriented x 3. Judgment and insight is normal.      Assessment & Plan:   49 y.o. female with the following -    1. Chronic pain of left knee  Patient previously established with Dr. Tam at [facility], she requests referral back to Dr. Tam. She has completed PT as well as xrays through [facility].  - REFERRAL TO ORTHOPEDICS    2. Essential hypertension  This is a chronic problem.  Patient reports her blood pressure was at goal while on lisinopril however she recently ran out and requests a refill today.  - lisinopril (PRINIVIL) 10 MG Tab; Take 1 Tab by mouth every day.  Dispense: 90 Tab; Refill: 3  - Lipid Profile; Future  - Comp Metabolic Panel; Future  - HEMOGLOBIN A1C; Future  - MICROALBUMIN CREAT RATIO URINE; Future  - CBC WITH DIFFERENTIAL; Future    3. Dyslipidemia  This is a chronic problem.  No recent lipid panel.  Patient reports healthy, Mediterranean type diet.  - Lipid Profile; Future  - Comp Metabolic Panel; Future  - HEMOGLOBIN A1C; Future    4. Encounter for vitamin deficiency screening  - VITAMIN D,25 HYDROXY; Future    5. Screen for STD (sexually transmitted disease)  - T.PALLIDUM AB EIA; Future  - Chlamydia/GC PCR Urine Or Swab; Future  - HIV AG/AB COMBO ASSAY SCREENING; Future    6. Need for vaccination  - Tdap Vaccine =>8YO IM      Return in about 1 month (around 9/18/2020) for F/U lab.    Please note this dictation  was created using voice recognition software. I have made every reasonable attempt to correct obvious errors, but I expect there may be errors of grammar, and possibly content, that I did not discover before finalizing the note.

## 2020-08-18 NOTE — LETTER
[facility]  Elodia Ponce M.D.  [address]  [place] [address]  Fax: [phone]   Authorization for Release/Disclosure of   Protected Health Information   Name: AGNIESZKA JOSEPH : 1970 SSN: [id number]   Address: [address] [place] [address] Phone:    [phone] (home)    I authorize the entity listed below to release/disclose the PHI below to:   [facility]/Elodia Ponce M.D. and Elodia Ponce M.D.   Provider or Entity Name:[facility]      Address   [place], [facility]   Phone:[phone]      Fax:[phone]     Reason for request: continuity of care   Information to be released:    [  ] LAST COLONOSCOPY,  including any PATH REPORT and follow-up  [  ] LAST FIT/COLOGUARD RESULT [  ] LAST DEXA  [  ] LAST MAMMOGRAM  [  ] LAST PAP  [  ] LAST LABS [  ] RETINA EXAM REPORT  [  ] IMMUNIZATION RECORDS  [ xxx ] Release all info      [  ] Check here and initial the line next to each item to release ALL health information INCLUDING  _____ Care and treatment for drug and / or alcohol abuse  _____ HIV testing, infection status, or AIDS  _____ Genetic Testing    DATES OF SERVICE OR TIME PERIOD TO BE DISCLOSED: _____________  I understand and acknowledge that:  * This Authorization may be revoked at any time by you in writing, except if your health information has already been used or disclosed.  * Your health information that will be used or disclosed as a result of you signing this authorization could be re-disclosed by the recipient. If this occurs, your re-disclosed health information may no longer be protected by State or Federal laws.  * You may refuse to sign this Authorization. Your refusal will not affect your ability to obtain treatment.  * This Authorization becomes effective upon signing and will  on (date) __________.      If no date is indicated, this Authorization will  one (1) year from the signature date.    Name: Agnieszka Newton  Milla    Signature:Continuation of Care   Date:     8/18/2020       PLEASE FAX REQUESTED RECORDS BACK TO: [phone]

## 2020-08-18 NOTE — ASSESSMENT & PLAN NOTE
This is a chronic problem.  The patient is currently managing with a healthy diet.  She tries to avoid red meat and eats mostly vegetables, fish, and chicken.  She exercises at least 5 times a week with [place] theory and/or running.  She denies family history of early heart attack or stroke.    Lab Results   Component Value Date/Time    CHOLSTRLTOT 220 (H) 11/08/2011 08:00 AM     11/08/2011 08:00 AM    HDL 48 11/08/2011 08:00 AM    TRIGLYCERIDE 65 11/08/2011 08:00 AM

## 2020-08-18 NOTE — ASSESSMENT & PLAN NOTE
This is a chronic problem. The patient was previously established with Dr. Tam at [facility] and requests a referral back to Dr. Tam today.  She reports she has had x-rays and completed physical therapy through [facility].  She continues to have intermittent left knee pain.

## 2020-08-24 LAB — HBA1C MFR BLD: 5.8 % (ref 0–5.6)

## 2020-09-22 ENCOUNTER — OFFICE VISIT (OUTPATIENT)
Dept: MEDICAL GROUP | Facility: MEDICAL CENTER | Age: 50
End: 2020-09-22
Payer: COMMERCIAL

## 2020-09-22 VITALS
RESPIRATION RATE: 18 BRPM | HEIGHT: 62 IN | TEMPERATURE: 98.1 F | BODY MASS INDEX: 29.33 KG/M2 | SYSTOLIC BLOOD PRESSURE: 128 MMHG | WEIGHT: 159.4 LBS | OXYGEN SATURATION: 98 % | HEART RATE: 53 BPM | DIASTOLIC BLOOD PRESSURE: 76 MMHG

## 2020-09-22 DIAGNOSIS — R80.9 MICROALBUMINURIA: ICD-10-CM

## 2020-09-22 DIAGNOSIS — G89.29 CHRONIC PAIN OF LEFT KNEE: ICD-10-CM

## 2020-09-22 DIAGNOSIS — I10 ESSENTIAL HYPERTENSION: ICD-10-CM

## 2020-09-22 DIAGNOSIS — R73.9 HYPERGLYCEMIA: ICD-10-CM

## 2020-09-22 DIAGNOSIS — Z23 NEED FOR VACCINATION: ICD-10-CM

## 2020-09-22 DIAGNOSIS — M25.562 CHRONIC PAIN OF LEFT KNEE: ICD-10-CM

## 2020-09-22 DIAGNOSIS — E55.9 VITAMIN D DEFICIENCY: ICD-10-CM

## 2020-09-22 DIAGNOSIS — E78.5 DYSLIPIDEMIA: ICD-10-CM

## 2020-09-22 PROCEDURE — 99214 OFFICE O/P EST MOD 30 MIN: CPT | Mod: 25 | Performed by: FAMILY MEDICINE

## 2020-09-22 PROCEDURE — 90686 IIV4 VACC NO PRSV 0.5 ML IM: CPT | Performed by: FAMILY MEDICINE

## 2020-09-22 PROCEDURE — 90471 IMMUNIZATION ADMIN: CPT | Performed by: FAMILY MEDICINE

## 2020-09-22 NOTE — ASSESSMENT & PLAN NOTE
This is a chronic problem.  Labs completed 8/24/2020 reveal total cholesterol 221, HDL 57, triglycerides 139, .  Her LDL is improved from 143. She tries to eat a whole foods diet with limited processed foods. She admits she could cut down on refined carbohydrates including bread and pasta.

## 2020-09-22 NOTE — ASSESSMENT & PLAN NOTE
This is a chronic problem.  The patient has an appointment with Dr. Tam at [facility] tomorrow.  She has completed x-rays and physical therapy through [facility].

## 2020-09-22 NOTE — ASSESSMENT & PLAN NOTE
This is a new problem.  Labs completed 8/24/2020 demonstrated albumin/creatinine 648, urine albumin 92.7. , BUN 14, Cr 0.68.  Patient denies known history of kidney disease.  No family history of kidney disease.  Patient does have hypertension.  Recently diagnosed with hyperglycemia, hemoglobin A1c 5.8; no history of overt DMII.

## 2020-09-22 NOTE — PROGRESS NOTES
Subjective:     CC: lab f/u    HPI:   Karely presents today with:    Dyslipidemia  This is a chronic problem.  Labs completed 8/24/2020 reveal total cholesterol 221, HDL 57, triglycerides 139, .  Her LDL is improved from 143. She tries to eat a whole foods diet with limited processed foods. She admits she could cut down on refined carbohydrates including bread and pasta.    HTN (hypertension)  This is a chronic problem.  The patient reports her blood pressures well controlled with lisinopril 10 mg daily.  Her home blood pressures are running 120s/80s. The patient denies chest pain, shortness of breath, headaches, vision changes, lightheadedness, dizziness, or medication side effects.      Left knee pain  This is a chronic problem.  The patient has an appointment with Dr. Tam at [facility] tomorrow.  She has completed x-rays and physical therapy through [facility].    Hyperglycemia  This is a new problem.  Hemoglobin A1c 5.8 on 8/24/2020.    Microalbuminuria  This is a new problem.  Labs completed 8/24/2020 demonstrated albumin/creatinine 648, urine albumin 92.7. , BUN 14, Cr 0.68.  Patient denies known history of kidney disease.  No family history of kidney disease.  Patient does have hypertension.  Recently diagnosed with hyperglycemia, hemoglobin A1c 5.8; no history of overt DMII.      Past Medical History:   Diagnosis Date   • Anesthesia     had some anxiety with rachel surgery   • Arthritis    • Dental disorder     upper and lower partials   • Dyslipidemia    • High cholesterol     Had lost some and improved with no meds.   • HTN    • Migraine with visual aura    • Right knee pain 06/06/2019    mild and unable to bend completely.        Social History     Tobacco Use   • Smoking status: Never Smoker   • Smokeless tobacco: Never Used   Substance Use Topics   • Alcohol use: No   • Drug use: No       Current Outpatient Medications Ordered in Epic   Medication Sig Dispense Refill   • lisinopril (PRINIVIL) 10 MG  "Tab Take 1 Tab by mouth every day. 90 Tab 3     No current Epic-ordered facility-administered medications on file.        Allergies:  Latex and Pcn [penicillins]    Health Maintenance: Flu shot administered; pap through Dr. Cox    ROS:  Gen: no fevers/chills, no changes in weight  Eyes: no changes in vision  ENT: no sore throat, no hearing loss, no bloody nose  Pulm: no SOB  CV: no chest pain        Objective:       Exam:  /76 (BP Location: Left arm, Patient Position: Sitting, BP Cuff Size: Adult long)   Pulse (!) 53   Temp 36.7 °C (98.1 °F) (Temporal)   Resp 18   Ht 1.575 m (5' 2\")   Wt 72.3 kg (159 lb 6.4 oz)   SpO2 98%   BMI 29.15 kg/m²  Body mass index is 29.15 kg/m².    Gen: Alert and oriented, No apparent distress  Lungs: Normal effort, CTA bilaterally, no wheezes, rhonchi, or rales  CV: Regular rate and rhythm, no murmurs, rubs, or gallops      Assessment & Plan:     49 y.o. female with the following -     1. Essential hypertension  This is a chronic problem.  Blood pressure goal on lisinopril 10 mg daily.  -Continue current regimen  - Comp Metabolic Panel; Future  - HEMOGLOBIN A1C; Future  - MICROALBUMIN CREAT RATIO URINE; Future    2. Microalbuminuria  This is a new problem.  No known history of kidney disease.  No family history of kidney disease.  Patient does have hypertension. HgbA1C 5.8, new dx of hyperglycemia.  - MICROALBUMIN CREAT RATIO URINE; Future  - URINETOTAL PROTEIN 24 HR; Future  - US-RENAL; Future    3. Dyslipidemia  This is a chronic problem. .  - Recommended Mediterranean type diet  - Continue daily exercise    4. Chronic pain of left knee  This is a chronic problem.  The patient is following with Dr. Tam at [facility]    5. Hyperglycemia  This is a new problem.  Hemoglobin A1c 5.8.  - Recommended decreasing refined carbohydrates  - Repeat HgbA1C in 3-6 months  - HEMOGLOBIN A1C; Future    6. Need for vaccination  - Influenza Vaccine Quad Injection (PF)    7. Vitamin D " deficiency  - VITAMIN D,25 HYDROXY; Future      Return in about 3 months (around 12/22/2020) for F/U lab.    Please note this dictation was created using voice recognition software. I have made every reasonable attempt to correct obvious errors, but I expect there may be errors of grammar, and possibly content, that I did not discover before finalizing the note.

## 2020-09-22 NOTE — ASSESSMENT & PLAN NOTE
This is a chronic problem.  The patient reports her blood pressures well controlled with lisinopril 10 mg daily.  Her home blood pressures are running 120s/80s. The patient denies chest pain, shortness of breath, headaches, vision changes, lightheadedness, dizziness, or medication side effects.

## 2021-01-12 ENCOUNTER — APPOINTMENT (OUTPATIENT)
Dept: PHYSICAL THERAPY | Facility: REHABILITATION | Age: 51
End: 2021-01-12
Attending: ORTHOPAEDIC SURGERY
Payer: COMMERCIAL

## 2021-01-14 ENCOUNTER — PHYSICAL THERAPY (OUTPATIENT)
Dept: PHYSICAL THERAPY | Facility: REHABILITATION | Age: 51
End: 2021-01-14
Attending: ORTHOPAEDIC SURGERY
Payer: COMMERCIAL

## 2021-01-14 DIAGNOSIS — S83.232A COMPLEX TEAR OF MEDIAL MENISCUS, CURRENT INJURY, LEFT KNEE, INITIAL ENCOUNTER: ICD-10-CM

## 2021-01-14 PROCEDURE — 97110 THERAPEUTIC EXERCISES: CPT

## 2021-01-14 PROCEDURE — 97161 PT EVAL LOW COMPLEX 20 MIN: CPT

## 2021-01-14 SDOH — ECONOMIC STABILITY: GENERAL: QUALITY OF LIFE: FAIR

## 2021-01-14 ASSESSMENT — ENCOUNTER SYMPTOMS
PAIN SCALE: 2
PAIN SCALE AT HIGHEST: 3
PAIN SCALE AT LOWEST: 2

## 2021-01-14 NOTE — OP THERAPY EVALUATION
Outpatient Physical Therapy  INITIAL EVALUATION    [facility] Physical Therapy [place]  [address]  [place] [address]  Phone:  [phone]  Fax:  [phone]    Date of Evaluation: 2021    Patient: Karely Loyola  YOB: 1970  MRN: [id number]     Referring Provider: Michelle Tam M.D.  [address] [place] [address]   Referring Diagnosis Complex tear of medial meniscus, current injury, left knee, initial encounter [S83.232A]     Time Calculation    Start time: 0900  Stop time: 1000 Time Calculation (min): 60 minutes         Chief Complaint: Post-Op Pain and Knee Problem    Visit Diagnoses     ICD-10-CM   1. Complex tear of medial meniscus, current injury, left knee, initial encounter  S83.232A         Subjective:   History of Present Illness:     Date of surgery:  2020  Quality of life:  Fair  Prior level of function:  Ongoing knee pain that resulted in surgery  Pain:     Current pain ratin    At best pain ratin    At worst pain rating:  3  Activities of Daily Living:     Patient reported ADL status: Limited with increased walking  Limited ability to do stairs  Unable to squat  Patient Goals:     Patient goals for therapy:  Increased strength, decreased pain and increased motion    Patient is a 50 y.o. female that presents to therapy status post knee scope. States that the surgery was need due to pain with all knee motion.. Reports the pain quality to be dull, constant and are primarily on the lateral portion of the L knee. Reports that symptoms now not changing. Denies red flags.   Past Medical History:   Diagnosis Date   • Anesthesia     had some anxiety with rachel surgery   • Arthritis    • Dental disorder     upper and lower partials   • Dyslipidemia    • High cholesterol     Had lost some and improved with no meds.   • HTN    • Migraine with visual aura    • Right knee pain 2019    mild and unable to bend completely.      Past  Surgical History:   Procedure Laterality Date   • ENDOMETRIAL ABLATION  2017   • SIMRAN BY LAPAROSCOPY     • TUBAL COAGULATION LAPAROSCOPIC BILATERAL Bilateral      Social History     Tobacco Use   • Smoking status: Never Smoker   • Smokeless tobacco: Never Used   Substance Use Topics   • Alcohol use: No     Family and Occupational History     Socioeconomic History   • Marital status:      Spouse name: Not on file   • Number of children: 4   • Years of education: 9th grade   • Highest education level: Not on file   Occupational History   • Occupation: House Keeping     Comment: Owns houskeeping       Objective     Observations   Left Knee   Positive for incision.     Additional Observation Details  Cleaned no signs of infection    Neurological Testing     Reflexes   Left   Patellar (L4): normal (2+)  Achilles (S1): normal (2+)  Ankle clonus reflex: negative  Babinski sign: negative    Right   Patellar (L4): normal (2+)  Achilles (S1): normal (2+)  Ankle clonus reflex: negative  Babinski sign: negative    Myotome testing   Lumbar (left)   L1 (hip flexors): 5  L2 (hip flexors): 5  L3 (knee extensors): 4  L4 (ankle dorsiflexors): 5  L5 (great toe extension): 5  S1 (ankle plantar flexors): 5    Lumbar (right)   L1 (hip flexors): 5  L2 (hip flexors): 5  L3 (knee extensors): 5  L4 (ankle dorsiflexors): 5  L5 (great toe extension): 5  S1 (ankle plantar flexors): 5    Dermatome testing   Lumbar (left)   All left lumbar dermatomes intact    Lumbar (right)   All right lumbar dermatomes intact    Active Range of Motion   Left Hip   Flexion: WFL  Extension: WFL  External rotation (90/90): WFL  Internal rotation (90/90): WFL    Right Hip   Flexion: WFL  Extension: WFL  External rotation (90/90): WFL  Internal rotation (90/90): WFL  Left Knee   Flexion: 134 degrees   Extension: 0 degrees   Extensor la degrees     Right Knee   Flexion: 136 degrees   Extension: 0 degrees     Patellar Mobility   Left Knee  Hypermobile in the left lateral patellar tendon(s). Hypomobile in the left medial, left superior and left inferior patellar tendon(s).     Right Knee Patellar tendons within functional limits include the superior and inferior. Hypermobile in the lateral patellar tendon(s). Hypomobile in the medial patellar tendon(s).     Strength:      Left Hip   Planes of Motion   Abduction: 3+  Adduction: 4+    Right Hip   Planes of Motion   Abduction: 5  Adduction: 4+    Left Knee   Flexion: 4+    Right Knee   Flexion: 5        Therapeutic Exercises (CPT 40206):       Therapeutic Exercise Summary:  Access Code: [email or web address]       Exercises Tra - 10 reps - 2 sets - 1x daily - 7x weekly   Supine Quad Set - 10 reps - 2 sets - 1x daily - 7x weekly   Clamshell - 10 reps - 2 sets - 1x daily - 7x weekly         Time-based treatments/modalities:    Physical Therapy Timed Treatment Charges  Therapeutic exercise minutes (CPT 73652): 10 minutes       Assessment, Response and Plan:   Impairments: abnormal or restricted ROM, activity intolerance and pain with function    Assessment details:  Patient presents with signs and symptoms consistent with a post op condition. Patient limitations include weakness, decreased ROM, and pain. Patient demonstrated good quad activation. Patient will benefit from skilled therapy to improve the aforementioned deficits and decrease further functional decline.   Prognosis: good    Goals:   Short Term Goals:   1) Patient's symptoms will improve to facilitate deep squatting without an increase in pain.  2) Patient's hip abd strength will improve by a half muscle grade to facilitate improved LE control.  Short term goal time span:  2-4 weeks      Long Term Goals:    1) Patient's symptoms will improve to allow for return to fitness activities.  2) Patient's LEFS will improve by 14 to demonstrate functional improvement  Long term goal time span:  6-8 weeks    Plan:   Therapy options:  Physical therapy treatment to  continue  Planned therapy interventions:  E Stim Unattended (CPT 95037), Hot or Cold Pack Therapy (CPT 25348), Manual Therapy (CPT 71902), Neuromuscular Re-education (CPT 47749) and Therapeutic Exercise (CPT 60289)  Frequency:  2x week  Duration in weeks:  6  Discussed with:  Patient      Functional Assessment Used  PT Functional Assessment Tool Used: LEFS  PT Functional Assessment Score: 46/80     Referring provider co-signature:  I have reviewed this plan of care and my co-signature certifies the need for services.    Certification Period: 01/14/2021 to  02/25/21    Physician Signature: ________________________________ Date: ______________

## 2021-01-18 ENCOUNTER — APPOINTMENT (OUTPATIENT)
Dept: RADIOLOGY | Facility: MEDICAL CENTER | Age: 51
End: 2021-01-18
Attending: FAMILY MEDICINE
Payer: COMMERCIAL

## 2021-01-19 ENCOUNTER — PHYSICAL THERAPY (OUTPATIENT)
Dept: PHYSICAL THERAPY | Facility: REHABILITATION | Age: 51
End: 2021-01-19
Attending: ORTHOPAEDIC SURGERY
Payer: COMMERCIAL

## 2021-01-19 DIAGNOSIS — S83.232A COMPLEX TEAR OF MEDIAL MENISCUS, CURRENT INJURY, LEFT KNEE, INITIAL ENCOUNTER: ICD-10-CM

## 2021-01-19 PROCEDURE — 97110 THERAPEUTIC EXERCISES: CPT

## 2021-01-19 NOTE — OP THERAPY DAILY TREATMENT
Outpatient Physical Therapy  DAILY TREATMENT     [facility] Outpatient Physical Therapy [place]  [address], [address]  [facility] [address]  Phone:  [phone]  Fax:  [phone]    Date: 01/19/2021    Patient: Karely Loyola  YOB: 1970  MRN: [id number]     Time Calculation    Start time: 0900  Stop time: 0930 Time Calculation (min): 30 minutes         Chief Complaint: Post-Op Pain and Knee Problem    Visit #: 2    SUBJECTIVE:  Patient reports that symptoms are minimal. Notes pain only after prolonged standing.     OBJECTIVE:  Current objective measures:   Continued poor hip control          Therapeutic Exercises (CPT 31943):     1. Bike, x5min    2. Shuttle max, x4min    3. Xband walks, x20      Therapeutic Exercise Summary:  Access Code: [email or web address]       Exercises Tra - 10 reps - 2 sets - 1x daily - 7x weekly   Supine Quad Set - 10 reps - 2 sets - 1x daily - 7x weekly   Clamshell - 10 reps - 2 sets - 1x daily - 7x weekly         Time-based treatments/modalities:    Physical Therapy Timed Treatment Charges  Therapeutic exercise minutes (CPT 58393): 30 minutes      Pain rating (1-10) before treatment:  0  Pain rating (1-10) after treatment:  0    ASSESSMENT:   Response to treatment: Patient continue to demonstrate hip weakness leading to poor IR control during stance phase. Continue with POC.     PLAN/RECOMMENDATIONS:   Plan for treatment: therapy treatment to continue next visit.  Planned interventions for next visit: continue with current treatment.

## 2021-01-21 ENCOUNTER — PHYSICAL THERAPY (OUTPATIENT)
Dept: PHYSICAL THERAPY | Facility: REHABILITATION | Age: 51
End: 2021-01-21
Attending: ORTHOPAEDIC SURGERY
Payer: COMMERCIAL

## 2021-01-21 DIAGNOSIS — S83.232A COMPLEX TEAR OF MEDIAL MENISCUS, CURRENT INJURY, LEFT KNEE, INITIAL ENCOUNTER: ICD-10-CM

## 2021-01-21 PROCEDURE — 97014 ELECTRIC STIMULATION THERAPY: CPT

## 2021-01-21 PROCEDURE — 97110 THERAPEUTIC EXERCISES: CPT

## 2021-01-21 NOTE — OP THERAPY DAILY TREATMENT
Outpatient Physical Therapy  DAILY TREATMENT     [facility] Outpatient Physical Therapy [place]  [address]  [place] [facility] [address]  Phone:  [phone]  Fax:  [phone]    Date: 01/21/2021    Patient: Karely Loyola  YOB: 1970  MRN: [id number]     Time Calculation    Start time: 0900  Stop time: 0945 Time Calculation (min): 45 minutes         Chief Complaint: Post-Op Pain and Knee Problem    Visit #: 3    SUBJECTIVE:  Patient     OBJECTIVE:  Current objective measures: Full knee AROM no quad lag          Therapeutic Exercises (CPT 66652):     1. Bike, x7min L8    2. Shuttle 2x SL DL with abd, x4min each      Therapeutic Exercise Summary: Access Code: [email or web address]        Program Notes   55cm ball     Exercises   Tra - 10 reps - 2 sets - 1x daily - 7x weekly   Clamshell - 10 reps - 2 sets - 1x daily - 7x weekly   Small Range Straight Leg Raise - 10 reps - 2 sets - 1x daily - 7x weekly   X Band Walk - 10 reps - 2 sets - 1x daily - 7x weekly   Supine Hamstring Stretch - 3 reps - 1 sets - 15sec hold - 3x daily - 7x weekly   Standing Terminal Knee Extension with Resistance - 10 reps - 2 sets - 1x daily - 7x weekly   Bird Dog on Swiss Ball - 10 reps - 2 sets - 1x daily - 7x weekly     Therapeutic Treatments and Modalities:     1. E Stim Unattended (CPT 20139), IFC with CP x15min 80-150hz    Time-based treatments/modalities:    Physical Therapy Timed Treatment Charges  Therapeutic exercise minutes (CPT 98851): 30 minutes      Pain rating (1-10) before treatment:  2  Pain rating (1-10) after treatment:  0    ASSESSMENT:   Response to treatment: Patient responded well to therapy with an overall decrease in symptoms and improvement in ROM and strength.     PLAN/RECOMMENDATIONS:   Plan for treatment: therapy treatment to continue next visit.  Planned interventions for next visit: continue with current treatment.

## 2021-01-27 ENCOUNTER — APPOINTMENT (OUTPATIENT)
Dept: PHYSICAL THERAPY | Facility: REHABILITATION | Age: 51
End: 2021-01-27
Attending: ORTHOPAEDIC SURGERY
Payer: COMMERCIAL

## 2021-02-04 ENCOUNTER — PHYSICAL THERAPY (OUTPATIENT)
Dept: PHYSICAL THERAPY | Facility: REHABILITATION | Age: 51
End: 2021-02-04
Attending: ORTHOPAEDIC SURGERY
Payer: COMMERCIAL

## 2021-02-04 DIAGNOSIS — S83.232A COMPLEX TEAR OF MEDIAL MENISCUS, CURRENT INJURY, LEFT KNEE, INITIAL ENCOUNTER: ICD-10-CM

## 2021-02-04 PROCEDURE — 97140 MANUAL THERAPY 1/> REGIONS: CPT

## 2021-02-05 NOTE — OP THERAPY DAILY TREATMENT
Outpatient Physical Therapy  DAILY TREATMENT     [facility] Outpatient Physical Therapy [place]  [address]  [place] [facility] [address]  Phone:  [phone]  Fax:  [phone]    Date: 02/04/2021    Patient: Karely Loyola  YOB: 1970  MRN: [id number]     Time Calculation    Start time: 1600  Stop time: 1630 Time Calculation (min): 30 minutes         Chief Complaint: Knee Problem and Post-Op Pain    Visit #: 4    SUBJECTIVE:  Patient reports continued lateral knee pain. States that symptoms are still present on initial contact.     OBJECTIVE:  Current objective measures:           Therapeutic Exercises (CPT 70633):     1. Bike, x7min L8    2. Clams, xfatigue    Therapeutic Treatments and Modalities:     1. Manual Therapy (CPT 09816), STM with and without tool to ITB and grade I-II mobs     Time-based treatments/modalities:    Physical Therapy Timed Treatment Charges  Manual therapy minutes (CPT 65975): 25 minutes  Therapeutic exercise minutes (CPT 14913): 5 minutes      Pain rating (1-10) before treatment:  1  Pain rating (1-10) after treatment:  1    ASSESSMENT:   Response to treatment: Patient responded well to therapy with an overall decrease in symptoms. Plan to continue with ITB mobs and MT for 1-2 visits.     PLAN/RECOMMENDATIONS:   Plan for treatment: therapy treatment to continue next visit.  Planned interventions for next visit: continue with current treatment.

## 2021-02-09 ENCOUNTER — APPOINTMENT (OUTPATIENT)
Dept: PHYSICAL THERAPY | Facility: REHABILITATION | Age: 51
End: 2021-02-09
Attending: ORTHOPAEDIC SURGERY
Payer: COMMERCIAL

## 2021-02-10 ENCOUNTER — PHYSICAL THERAPY (OUTPATIENT)
Dept: PHYSICAL THERAPY | Facility: REHABILITATION | Age: 51
End: 2021-02-10
Attending: ORTHOPAEDIC SURGERY
Payer: COMMERCIAL

## 2021-02-10 DIAGNOSIS — S83.232A COMPLEX TEAR OF MEDIAL MENISCUS, CURRENT INJURY, LEFT KNEE, INITIAL ENCOUNTER: ICD-10-CM

## 2021-02-10 PROCEDURE — 97110 THERAPEUTIC EXERCISES: CPT

## 2021-02-10 PROCEDURE — 97140 MANUAL THERAPY 1/> REGIONS: CPT

## 2021-02-11 ENCOUNTER — APPOINTMENT (OUTPATIENT)
Dept: PHYSICAL THERAPY | Facility: REHABILITATION | Age: 51
End: 2021-02-11
Attending: ORTHOPAEDIC SURGERY
Payer: COMMERCIAL

## 2021-02-11 NOTE — OP THERAPY DAILY TREATMENT
Outpatient Physical Therapy  DAILY TREATMENT     [facility] Outpatient Physical Therapy [place]  [address]  [facility] [address]  Phone:  [phone]  Fax:  [phone]    Date: 02/10/2021    Patient: Karely Loyola  YOB: 1970  MRN: [id number]     Time Calculation    Start time: 0930  Stop time: 1000 Time Calculation (min): 30 minutes         Chief Complaint: Knee Problem    Visit #: 5    SUBJECTIVE:  Patient reports continued 0 knee pain.    OBJECTIVE:  Current objective measures:           Therapeutic Exercises (CPT 88769):     1. Bike, x7min L8    2. Clams, xfatigue    3. SL hip abd, x20    Therapeutic Treatments and Modalities:     1. Manual Therapy (CPT 29802), STM with and without tool to ITB and grade I-II mobs ; Edu with ITB roller    Time-based treatments/modalities:    Physical Therapy Timed Treatment Charges  Manual therapy minutes (CPT 50045): 10 minutes  Therapeutic exercise minutes (CPT 40478): 20 minutes      Pain rating (1-10) before treatment:  0  Pain rating (1-10) after treatment:  0    ASSESSMENT:   Response to treatment: Patient responded well to therapy with an overall decrease in symptoms and improvement in function.     PLAN/RECOMMENDATIONS:   Plan for treatment: therapy treatment to continue next visit.  Planned interventions for next visit: continue with current treatment.

## 2021-02-16 ENCOUNTER — PHYSICAL THERAPY (OUTPATIENT)
Dept: PHYSICAL THERAPY | Facility: REHABILITATION | Age: 51
End: 2021-02-16
Attending: ORTHOPAEDIC SURGERY
Payer: COMMERCIAL

## 2021-02-16 DIAGNOSIS — S83.232A COMPLEX TEAR OF MEDIAL MENISCUS, CURRENT INJURY, LEFT KNEE, INITIAL ENCOUNTER: ICD-10-CM

## 2021-02-16 PROCEDURE — 97110 THERAPEUTIC EXERCISES: CPT

## 2021-02-16 NOTE — OP THERAPY DAILY TREATMENT
Outpatient Physical Therapy  DAILY TREATMENT     [facility] Outpatient Physical Therapy [place]  [address]  [facility] [address]  Phone:  [phone]  Fax:  [phone]    Date: 02/16/2021    Patient: Karely Loyola  YOB: 1970  MRN: [id number]     Time Calculation    Start time: 0700  Stop time: 0730 Time Calculation (min): 30 minutes         Chief Complaint: Post-Op Pain and Knee Problem    Visit #: 6    SUBJECTIVE:  Patient reports no knee pain states that she is doing well.     OBJECTIVE:  Current objective measures:   AROM 0-140  Hip abd 4+/5 B  PT Functional Assessment Tool Used: LEFS  PT Functional Assessment Score: 60/80       Therapeutic Exercises (CPT 16932):       Therapeutic Exercise Summary: Access Code: [email or web address]      Exercises  Clamshell with Resistance - 10 reps - 2 sets - 1x daily - 7x weekly  Small Range Straight Leg Raise - 10 reps - 2 sets - 1x daily - 7x weekly  Sidelying IT Band Foam Roll Mobilization - 5 reps - 1 sets - 1x daily - 7x weekly  Sidelying ITB Stretch off Table - 3 reps - 1 sets - 15sec hold - 3x daily - 7x weekly  Supine Hamstring Stretch - 3 reps - 1 sets - 15sec hold - 3x daily - 7x weekly  Bird Dog on Swiss Ball - 10 reps - 2 sets - 1x daily - 7x weekly  Full Leg Press - 10 reps - 2 sets - 1x daily - 7x weekly  Half Kneeling Hip Flexor Stretch - 3 reps - 1 sets - 15sec hold - 3x daily - 7x weekly  Supine Knee Flexion AAROM at Wall - 10 reps - 2 sets - 1x daily - 7x weekly  Prone Quadriceps Stretch with Strap - 3 reps - 1 sets - 15sec hold - 3x daily - 7x weekly      Time-based treatments/modalities:    Physical Therapy Timed Treatment Charges  Therapeutic exercise minutes (CPT 95817): 30 minutes      Pain rating (1-10) before treatment:  0  Pain rating (1-10) after treatment:  0    ASSESSMENT:   Response to treatment: Patient responded well to therapy with an overall increase in ROM and decrease in pain.     PLAN/RECOMMENDATIONS:   Plan for  treatment: therapy treatment to continue next visit.  Planned interventions for next visit: continue with current treatment.

## 2021-02-16 NOTE — OP THERAPY PROGRESS SUMMARY
Outpatient Physical Therapy  PROGRESS SUMMARY NOTE      [facility] [place]  [address]  [place] [address]  Phone:  [phone]  Fax:  [phone]    Date of Visit: 02/16/2021    Patient: Karely Loyola  YOB: 1970  MRN: [id number]     Referring Provider: Michelle Tam M.D.  [address] [place] [address]   Referring Diagnosis Complex tear of medial meniscus, current injury, left knee, initial encounter [S83.232A]     Visit Diagnoses     ICD-10-CM   1. Complex tear of medial meniscus, current injury, left knee, initial encounter  S83.232A       Rehab Potential: good    Progress Report Period: Eval to present    Functional Assessment Used  PT Functional Assessment Tool Used: LEFS  PT Functional Assessment Score: 60/80       Objective Findings and Assessment:   Patient progression towards goals: Patient has been seen for 6 visits. Patient has made good gains in strength and ROM. Patient reports that she is still having slight difficulty with full squatting. Patient will now decrease therapy visits and focus on HEP. The last bit of patient's pain maybe related to a tight ITB. She is to follow up if needed.     Objective findings and assessment details: AROM 0-140  Hip abd 4+/5 B    Goals:   Short Term Goals:   1) Patient's symptoms will improve to facilitate deep squatting without an increase in pain. MET  2) Patient's hip abd strength will improve by a half muscle grade to facilitate improved LE control. MET  Short term goal time span:  2-4 weeks      Long Term Goals:    1) Patient's symptoms will improve to allow for return to fitness activities. MET not running  2) Patient's LEFS will improve by 14 to demonstrate functional improvement MET  Long term goal time span:  6-8 weeks    Plan:   Planned therapy interventions:  E Stim Unattended (CPT 26333), Manual Therapy (CPT 25319), Neuromuscular Re-education (CPT 34221) and Therapeutic Exercise  (CPT 71955)  Frequency:  3x month  Duration in weeks:  6      Referring provider co-signature:  I have reviewed this plan of care and my co-signature certifies the need for services.     Certification Period: 02/16/2021 to 03/30/21    Physician Signature: ________________________________ Date: ______________

## 2021-03-10 ENCOUNTER — PHYSICAL THERAPY (OUTPATIENT)
Dept: PHYSICAL THERAPY | Facility: REHABILITATION | Age: 51
End: 2021-03-10
Attending: ORTHOPAEDIC SURGERY
Payer: COMMERCIAL

## 2021-03-10 DIAGNOSIS — S83.232A COMPLEX TEAR OF MEDIAL MENISCUS, CURRENT INJURY, LEFT KNEE, INITIAL ENCOUNTER: ICD-10-CM

## 2021-03-10 PROCEDURE — 97110 THERAPEUTIC EXERCISES: CPT

## 2021-03-10 NOTE — OP THERAPY DAILY TREATMENT
Outpatient Physical Therapy  DAILY TREATMENT     [facility] Outpatient Physical Therapy [place]  [address]  [facility] [place] [address]  Phone:  [phone]  Fax:  [phone]    Date: 03/10/2021    Patient: Karely Loyola  YOB: 1970  MRN: [id number]     Time Calculation    Start time: 0730  Stop time: 0800 Time Calculation (min): 30 minutes         Chief Complaint: Knee Problem and Post-Op Pain    Visit #: 7    SUBJECTIVE:  Patient reports that she is doing well with no pain. States she has now gone back to gym.     OBJECTIVE:  Current objective measures:   Hip abd 5/5  Full knee AROM          Therapeutic Exercises (CPT 25262):     1. Add stair training, x5min      Therapeutic Exercise Summary: Access Code: [email or web address]      Exercises  Clamshell with Resistance - 10 reps - 2 sets - 1x daily - 7x weekly  Small Range Straight Leg Raise - 10 reps - 2 sets - 1x daily - 7x weekly  Sidelying IT Band Foam Roll Mobilization - 5 reps - 1 sets - 1x daily - 7x weekly  Sidelying ITB Stretch off Table - 3 reps - 1 sets - 15sec hold - 3x daily - 7x weekly  Supine Hamstring Stretch - 3 reps - 1 sets - 15sec hold - 3x daily - 7x weekly  Bird Dog on Swiss Ball - 10 reps - 2 sets - 1x daily - 7x weekly  Full Leg Press - 10 reps - 2 sets - 1x daily - 7x weekly  Half Kneeling Hip Flexor Stretch - 3 reps - 1 sets - 15sec hold - 3x daily - 7x weekly  Supine Knee Flexion AAROM at Wall - 10 reps - 2 sets - 1x daily - 7x weekly  Prone Quadriceps Stretch with Strap - 3 reps - 1 sets - 15sec hold - 3x daily - 7x weekly      Time-based treatments/modalities:    Physical Therapy Timed Treatment Charges  Therapeutic exercise minutes (CPT 08873): 25 minutes      Pain rating (1-10) before treatment:  0  Pain rating (1-10) after treatment:  0    ASSESSMENT:   Response to treatment: Discharge due to accomplished goals.     PLAN/RECOMMENDATIONS:   Plan for treatment: discharge patient due to accomplished goals.  Planned  interventions for next visit: Discharge to [facility].

## 2021-03-10 NOTE — OP THERAPY DISCHARGE SUMMARY
Outpatient Physical Therapy  DISCHARGE SUMMARY NOTE      [facility] Physical Therapy [place]  [address]  [facility] [place] [address]  Phone:  [phone]  Fax:  [phone]    Date of Visit: 03/10/2021    Patient: Karely Loyola  YOB: 1970  MRN: [id number]     Referring Provider: Michelle Tam M.D.  [address] [place] [address]   Referring Diagnosis Complex tear of medial meniscus, current injury, left knee, initial encounter [S83.232A]           Your patient is being discharged from Physical Therapy with the following comments:   · Goals met    Comments:  Discharge to HEP     Limitations Remaining:  NA    Recommendations:  Discharge to HEP    Jd Hartley, PT, DPT    Date: 3/10/2021

## 2022-02-16 ENCOUNTER — APPOINTMENT (OUTPATIENT)
Dept: MEDICAL GROUP | Facility: MEDICAL CENTER | Age: 52
End: 2022-02-16
Payer: COMMERCIAL

## 2022-02-23 ENCOUNTER — OFFICE VISIT (OUTPATIENT)
Dept: MEDICAL GROUP | Facility: MEDICAL CENTER | Age: 52
End: 2022-02-23
Payer: COMMERCIAL

## 2022-02-23 VITALS
TEMPERATURE: 97.4 F | RESPIRATION RATE: 14 BRPM | DIASTOLIC BLOOD PRESSURE: 82 MMHG | SYSTOLIC BLOOD PRESSURE: 124 MMHG | OXYGEN SATURATION: 95 % | HEART RATE: 82 BPM | WEIGHT: 159.61 LBS | HEIGHT: 62 IN | BODY MASS INDEX: 29.37 KG/M2

## 2022-02-23 DIAGNOSIS — I10 PRIMARY HYPERTENSION: ICD-10-CM

## 2022-02-23 DIAGNOSIS — R73.9 HYPERGLYCEMIA: ICD-10-CM

## 2022-02-23 DIAGNOSIS — Z00.00 ENCOUNTER FOR SCREENING AND PREVENTATIVE CARE: ICD-10-CM

## 2022-02-23 DIAGNOSIS — R11.2 NON-INTRACTABLE VOMITING WITH NAUSEA, UNSPECIFIED VOMITING TYPE: ICD-10-CM

## 2022-02-23 DIAGNOSIS — Z12.31 ENCOUNTER FOR SCREENING MAMMOGRAM FOR MALIGNANT NEOPLASM OF BREAST: ICD-10-CM

## 2022-02-23 DIAGNOSIS — E78.5 DYSLIPIDEMIA: ICD-10-CM

## 2022-02-23 DIAGNOSIS — N89.8 VAGINAL DRYNESS: ICD-10-CM

## 2022-02-23 DIAGNOSIS — Z12.11 COLON CANCER SCREENING: ICD-10-CM

## 2022-02-23 PROCEDURE — 99396 PREV VISIT EST AGE 40-64: CPT | Performed by: FAMILY MEDICINE

## 2022-02-23 RX ORDER — CONJUGATED ESTROGENS 0.62 MG/G
CREAM VAGINAL
Qty: 30 G | Refills: 3 | Status: SHIPPED | OUTPATIENT
Start: 2022-02-23 | End: 2023-02-17 | Stop reason: SDUPTHER

## 2022-02-23 ASSESSMENT — ANXIETY QUESTIONNAIRES
4. TROUBLE RELAXING: SEVERAL DAYS
1. FEELING NERVOUS, ANXIOUS, OR ON EDGE: SEVERAL DAYS
3. WORRYING TOO MUCH ABOUT DIFFERENT THINGS: MORE THAN HALF THE DAYS
7. FEELING AFRAID AS IF SOMETHING AWFUL MIGHT HAPPEN: NOT AT ALL
GAD7 TOTAL SCORE: 9
5. BEING SO RESTLESS THAT IT IS HARD TO SIT STILL: SEVERAL DAYS
2. NOT BEING ABLE TO STOP OR CONTROL WORRYING: SEVERAL DAYS
6. BECOMING EASILY ANNOYED OR IRRITABLE: NEARLY EVERY DAY

## 2022-02-23 ASSESSMENT — PATIENT HEALTH QUESTIONNAIRE - PHQ9
SUM OF ALL RESPONSES TO PHQ QUESTIONS 1-9: 9
5. POOR APPETITE OR OVEREATING: 3 - NEARLY EVERY DAY
CLINICAL INTERPRETATION OF PHQ2 SCORE: 2

## 2022-02-23 NOTE — LETTER
[facility]  Elodia Ponce M.D.  [address]  [place] [address]  Fax: [phone]   Authorization for Release/Disclosure of   Protected Health Information   Name: AGNIESZKA JOSEPH : 1970 SSN: [id number]   Address: [address] [place] [address] Phone:    [phone] (home)    I authorize the entity listed below to release/disclose the PHI below to:   [facility]/Elodia Ponce M.D. and Elodia Ponce M.D.   Provider or Entity Name:[facility]     Address   [facility] [facility]   Phone:      Fax:[phone]     Reason for request: continuity of care   Information to be released:    [  ] LAST COLONOSCOPY,  including any PATH REPORT and follow-up  [  ] LAST FIT/COLOGUARD RESULT [  ] LAST DEXA  [  ] LAST MAMMOGRAM  [  ] LAST PAP  [  ] LAST LABS [  ] RETINA EXAM REPORT  [  ] IMMUNIZATION RECORDS  [ xxx ] Release all info      [  ] Check here and initial the line next to each item to release ALL health information INCLUDING  _____ Care and treatment for drug and / or alcohol abuse  _____ HIV testing, infection status, or AIDS  _____ Genetic Testing    DATES OF SERVICE OR TIME PERIOD TO BE DISCLOSED: _____________  I understand and acknowledge that:  * This Authorization may be revoked at any time by you in writing, except if your health information has already been used or disclosed.  * Your health information that will be used or disclosed as a result of you signing this authorization could be re-disclosed by the recipient. If this occurs, your re-disclosed health information may no longer be protected by State or Federal laws.  * You may refuse to sign this Authorization. Your refusal will not affect your ability to obtain treatment.  * This Authorization becomes effective upon signing and will  on (date) __________.      If no date is indicated, this Authorization will  one (1) year from the signature date.    Name: Agnieszka Newton  Milla    Signature:Continuation of Care   Date:     2/23/2022       PLEASE FAX REQUESTED RECORDS BACK TO: [phone]

## 2022-02-23 NOTE — ASSESSMENT & PLAN NOTE
Patient was seen at [facility] ED for nausea, vomiting, and abdominal pain 2/16/2022. She reports she was treated with anti-nausea medication and IVFs. She reports CT abdomen was normal.     She reports her symptoms resolved prior to ED discharge.     She reports the nausea has resolved, no recurrent emesis.

## 2022-02-23 NOTE — PROGRESS NOTES
Subjective:     CC:   Chief Complaint   Patient presents with   • Annual Exam     BP       HPI:   Karely Loyola is a 51 y.o. female who presents for annual exam. She is feeling well and denies any complaints at this time.     Ob-Gyn/ History:    Patient has GYN provider: yes   Last Pap Smear:  7/2020. No history of abnormal pap smears.  Gyn Surgery:  Uterine ablation 2018.  Current Contraceptive Method:  S/p uterine ablation. She is currently sexually active.  Last menstrual period: 2018  No significant bloating/fluid retention, pelvic pain, or dyspareunia. No vaginal discharge  Post-menopausal bleeding: None    Health Maintenance  PT/vit D for falls prevention: Patient reports diet adequate in vit D, daily weight bearing exercise   Cholesterol Screening: ordered  Diabetes Screening: ordered   Aspirin Use: NA    Diet: Patient trying to maintain healthy diet   Exercise: exercising 3-4X/weekly   Substance Abuse: None   Safe in relationship.   Sun protection used.    Cancer screening  Colorectal Cancer Screening: referral placed    Lung Cancer Screening: NA    Cervical Cancer Screening: UTD   Breast Cancer Screening: mammo ordered     She  has a past medical history of Anesthesia, Arthritis, Dental disorder, Dyslipidemia, High cholesterol, HTN, Migraine with visual aura, and Right knee pain (06/06/2019).  She  has a past surgical history that includes tubal coagulation laparoscopic bilateral (Bilateral, 1995); endometrial ablation (2017); and rachel by laparoscopy (2005).    Family History   Problem Relation Age of Onset   • Cancer Mother         uterine       Social History     Socioeconomic History   • Marital status:      Spouse name: Not on file   • Number of children: 4   • Years of education: 9th grade   • Highest education level: Not on file   Occupational History   • Occupation: House Keeping     Comment: Owns [employer]   Tobacco Use   • Smoking status: Never Smoker   • Smokeless tobacco: Never  Used   Vaping Use   • Vaping Use: Never used   Substance and Sexual Activity   • Alcohol use: No   • Drug use: No   • Sexual activity: Yes     Partners: Male   Other Topics Concern   •  Service Not Asked   • Blood Transfusions Not Asked   • Caffeine Concern Not Asked   • Occupational Exposure Not Asked   • Hobby Hazards Not Asked   • Sleep Concern Not Asked   • Stress Concern Not Asked   • Weight Concern Not Asked   • Special Diet Not Asked   • Back Care Not Asked   • Exercise Not Asked   • Bike Helmet Not Asked   • Seat Belt Yes   • Self-Exams Not Asked   Social History Narrative   • Not on file     Social Determinants of Health     Financial Resource Strain: Not on file   Food Insecurity: Not on file   Transportation Needs: Not on file   Physical Activity: Not on file   Stress: Not on file   Social Connections: Not on file   Intimate Partner Violence: Not on file   Housing Stability: Not on file       Patient Active Problem List    Diagnosis Date Noted   • Nausea and vomiting 02/23/2022   • Hyperglycemia 09/22/2020   • Microalbuminuria 09/22/2020   • Environmental allergies 04/17/2018   • Left knee pain 02/08/2018   • HTN (hypertension)    • Dyslipidemia          Current Outpatient Medications   Medication Sig Dispense Refill   • estrogens, conjugated (PREMARIN) 0.625 MG/GM Cream Apply thin layer to vaginal area twice weekly 30 g 3   • lisinopril (PRINIVIL) 10 MG Tab Take 1 Tablet by mouth every day. 30 Tablet 2     No current facility-administered medications for this visit.     Allergies   Allergen Reactions   • Latex Hives     Oral welts at dentist office with latex gloves.    • Pcn [Penicillins] Rash and Itching     .       Review of Systems   Constitutional: Negative for fever and chills  HENT: Negative for congestion.    Eyes: Negative for blurry vision.  Respiratory: Negative for cough and shortness of breath.    Cardiovascular: Negative for chest pain and leg swelling.   Gastrointestinal: Negative  "for nausea, vomiting, abdominal pain and diarrhea.   Genitourinary: Negative for dysuria and hematuria.   Skin: Negative for rash.   Neurological: Negative for dizziness, focal weakness and headaches.   Heme: Does not bruise/bleed easily.   Psychiatric/Behavioral: Negative for depression.  The patient is not nervous/anxious.      Objective:     /82 (BP Location: Left arm, Patient Position: Sitting, BP Cuff Size: Adult)   Pulse 82   Temp 36.3 °C (97.4 °F) (Temporal)   Resp 14   Ht 1.575 m (5' 2\")   Wt 72.4 kg (159 lb 9.8 oz)   SpO2 95%   BMI 29.19 kg/m²   Body mass index is 29.19 kg/m².  Wt Readings from Last 4 Encounters:   02/23/22 72.4 kg (159 lb 9.8 oz)   09/22/20 72.3 kg (159 lb 6.4 oz)   08/18/20 73.4 kg (161 lb 12.8 oz)   03/25/19 69.5 kg (153 lb 3.2 oz)       Physical Exam:  Constitutional: Well-developed, well-nourished. Appears staged age.   Skin: Skin is warm and dry. No rash noted.  Head: Atraumatic without obvious lesions.  Eyes: Conjunctivae and extraocular motions intact. Pupils are equal, round, and reactive to light. No scleral icterus.   Ears:  External ears unremarkable. Tympanic membranes clear and intact.  Nose: Nares patent. Septum midline. Turbinates without erythema nor edema. No discharge.   Neck: Supple, trachea midline. Normal range of motion. No thyromegaly present. No lymphadenopathy--cervical or supraclavicular.  Cardiovascular: Regular rate and rhythm, S1 and S2 without murmur, rubs, or gallops.  Lungs: Normal inspiratory effort, CTA bilaterally, no wheezes/rhonchi/rales  Abdomen: Soft, non tender, and without distention. Active bowel sounds. No rebound or guarding.   Extremities: No cyanosis, clubbing, erythema, nor edema. Distal pulses intact and symmetric.   Neurological: Alert and oriented x 3.   Psychiatric:  Behavior, euthymic affect    Assessment and Plan:     1. Encounter for screening and preventative care  HCM:  Pap UTD, mammogram and colonoscopy referral ordered "   Labs: per below  Immunizations: flu immunization UTD per pt report  Patient counseled about skin care, diet, exercise, supplements, and gun safety.    2. Non-intractable vomiting with nausea, unspecified vomiting type  Recently seen an [facility] for N/V and abdominal pain; pt reports CT abdomen normal, symptoms have resolved, requesting records    3. Primary hypertension  - Continue lisinopril 10mg daily, healthy diet and exercise  - Comp Metabolic Panel; Future  - HEMOGLOBIN A1C; Future  - Lipid Profile; Future  - MICROALBUMIN CREAT RATIO URINE; Future  - CBC WITH DIFFERENTIAL; Future    4. Encounter for screening mammogram for malignant neoplasm of breast  - MA-SCREENING MAMMO BILAT W/TOMOSYNTHESIS W/CAD; Future    5. Dyslipidemia  - Continue healthy diet and exercise  - Comp Metabolic Panel; Future  - HEMOGLOBIN A1C; Future  - Lipid Profile; Future    6. Hyperglycemia  - HEMOGLOBIN A1C; Future    7. Vaginal dryness  - estrogens, conjugated (PREMARIN) 0.625 MG/GM Cream; Apply thin layer to vaginal area twice weekly  Dispense: 30 g; Refill: 3    8. Colon cancer screening  - Referral to GI for Colonoscopy        Follow-up: Return in about 6 months (around 8/23/2022).

## 2022-02-23 NOTE — ASSESSMENT & PLAN NOTE
This is a chronic problem.  The patient reports her blood pressures well controlled with lisinopril 10 mg daily.  Her home blood pressures are running 120s/80s.

## 2022-03-03 LAB — HBA1C MFR BLD: 5.8 % (ref 0–5.6)

## 2022-03-23 ENCOUNTER — APPOINTMENT (OUTPATIENT)
Dept: RADIOLOGY | Facility: MEDICAL CENTER | Age: 52
End: 2022-03-23
Attending: FAMILY MEDICINE
Payer: COMMERCIAL

## 2022-09-14 ENCOUNTER — OFFICE VISIT (OUTPATIENT)
Dept: MEDICAL GROUP | Facility: MEDICAL CENTER | Age: 52
End: 2022-09-14
Payer: COMMERCIAL

## 2022-09-14 VITALS
TEMPERATURE: 97.7 F | WEIGHT: 160 LBS | SYSTOLIC BLOOD PRESSURE: 116 MMHG | BODY MASS INDEX: 29.44 KG/M2 | OXYGEN SATURATION: 97 % | HEIGHT: 62 IN | RESPIRATION RATE: 12 BRPM | HEART RATE: 67 BPM | DIASTOLIC BLOOD PRESSURE: 64 MMHG

## 2022-09-14 DIAGNOSIS — M72.2 PLANTAR FASCIITIS: ICD-10-CM

## 2022-09-14 DIAGNOSIS — R73.9 HYPERGLYCEMIA: ICD-10-CM

## 2022-09-14 DIAGNOSIS — E78.5 DYSLIPIDEMIA: ICD-10-CM

## 2022-09-14 DIAGNOSIS — I10 PRIMARY HYPERTENSION: ICD-10-CM

## 2022-09-14 DIAGNOSIS — Z12.31 ENCOUNTER FOR SCREENING MAMMOGRAM FOR MALIGNANT NEOPLASM OF BREAST: ICD-10-CM

## 2022-09-14 PROBLEM — R11.2 NAUSEA AND VOMITING: Status: RESOLVED | Noted: 2022-02-23 | Resolved: 2022-09-14

## 2022-09-14 PROCEDURE — 99214 OFFICE O/P EST MOD 30 MIN: CPT | Performed by: FAMILY MEDICINE

## 2022-09-14 NOTE — ASSESSMENT & PLAN NOTE
Karely reports bilateral foot pain c/w plantar fasciitis for the past couple months. She has been doing home stretches and would like a referral to PT and podiatry.

## 2022-09-14 NOTE — PROGRESS NOTES
Subjective:     CC: follow up, foot pain    HPI:   Karely presents today with:    HTN (hypertension)  This is a chronic problem.  The patient reports her blood pressures well controlled with lisinopril 10 mg daily.  Her home blood pressures are running 120s/80s.       Dyslipidemia  Karely    Plantar fasciitis  Karely reports bilateral foot pain c/w plantar fasciitis for the past couple months. She has been doing home stretches and would like a referral to PT and podiatry.    Hyperglycemia  Previous HgbA1C 5.8. Karely is working to minimize refined carbohydrate intake.      Past Medical History:   Diagnosis Date    Anesthesia     had some anxiety with rachel surgery    Arthritis     Dental disorder     upper and lower partials    Dyslipidemia     High cholesterol     Had lost some and improved with no meds.    HTN     Migraine with visual aura     Right knee pain 06/06/2019    mild and unable to bend completely.        Social History     Tobacco Use    Smoking status: Never    Smokeless tobacco: Never   Vaping Use    Vaping Use: Never used   Substance Use Topics    Alcohol use: No    Drug use: No       Current Outpatient Medications Ordered in Epic   Medication Sig Dispense Refill    lisinopril (PRINIVIL) 10 MG Tab TAKE 1 TABLET BY MOUTH EVERY DAY 90 Tablet 2    estrogens, conjugated (PREMARIN) 0.625 MG/GM Cream Apply thin layer to vaginal area twice weekly 30 g 3     No current [facility]-ordered facility-administered medications on file.       Allergies:  Latex and Pcn [penicillins]    Health Maintenance: mammogram ordered, discussed vaccinations patient will get at pharmacy    ROS:  Gen: no fevers/chills, no changes in weight  Eyes: no changes in vision  ENT: no sore throat, no hearing loss, no bloody nose  Pulm: no SOB  CV: no chest pain        Objective:       Exam:  /64 (BP Location: Left arm, Patient Position: Sitting, BP Cuff Size: Adult)   Pulse 67   Temp 36.5 °C (97.7 °F) (Temporal)   Resp 12   Ht 1.575 m  "(5' 2\")   Wt 72.6 kg (160 lb)   SpO2 97%   BMI 29.26 kg/m²  Body mass index is 29.26 kg/m².    Gen: Alert and oriented, No apparent distress  Lungs: Normal effort, CTA bilaterally, no wheezes, rhonchi, or rales  CV: Regular rate and rhythm, no murmurs, rubs, or gallops      Assessment & Plan:     51 y.o. female with the following -     1. Plantar fasciitis  - Referral to Podiatry  - Referral to Physical Therapy    2. Primary hypertension  - COntinue lisinopril 10mg daily  - Lipid Profile; Future  - Comp Metabolic Panel; Future  - HEMOGLOBIN A1C; Future  - MICROALBUMIN CREAT RATIO URINE; Future  - CBC WITH DIFFERENTIAL; Future    3. Dyslipidemia  - Discussed Mediterranean type diet  - Lipid Profile; Future    4. Hyperglycemia  - Continue to minimize refined carbs  - HEMOGLOBIN A1C; Future    5. Encounter for screening mammogram for malignant neoplasm of breast  - MA-SCREENING MAMMO BILAT W/TOMOSYNTHESIS W/CAD; Future    F/u 3 months, patient will be establishing with Dr. Quinn.    Please note this dictation was created using voice recognition software. I have made every reasonable attempt to correct obvious errors, but I expect there may be errors of grammar, and possibly content, that I did not discover before finalizing the note.         "

## 2022-09-28 ENCOUNTER — TELEPHONE (OUTPATIENT)
Dept: MEDICAL GROUP | Facility: MEDICAL CENTER | Age: 52
End: 2022-09-28
Payer: COMMERCIAL

## 2022-09-28 NOTE — RESULT ENCOUNTER NOTE
Parmjit Cali,     I have reviewed your labs which are stable. You can discuss the results in detail with Dr. Quinn in January, you will love him.    Have a great day!    - Elodia Zheng MD

## 2022-09-28 NOTE — TELEPHONE ENCOUNTER
----- Message from Elodia Ponce M.D. sent at 9/28/2022  9:02 AM PDT -----  Parmjit Cali,     I have reviewed your labs which are stable. You can discuss the results in detail with Dr. Quinn in January, you will love him.    Have a great day!    - Elodia Zheng MD

## 2022-12-06 ENCOUNTER — OFFICE VISIT (OUTPATIENT)
Dept: MEDICAL GROUP | Facility: PHYSICIAN GROUP | Age: 52
End: 2022-12-06
Payer: COMMERCIAL

## 2022-12-06 VITALS
HEART RATE: 54 BPM | OXYGEN SATURATION: 97 % | BODY MASS INDEX: 29.81 KG/M2 | TEMPERATURE: 97.6 F | WEIGHT: 162 LBS | DIASTOLIC BLOOD PRESSURE: 88 MMHG | HEIGHT: 62 IN | SYSTOLIC BLOOD PRESSURE: 140 MMHG

## 2022-12-06 DIAGNOSIS — I10 ESSENTIAL HYPERTENSION: ICD-10-CM

## 2022-12-06 PROCEDURE — 99214 OFFICE O/P EST MOD 30 MIN: CPT | Performed by: FAMILY MEDICINE

## 2022-12-06 RX ORDER — LISINOPRIL 40 MG/1
40 TABLET ORAL
Qty: 90 TABLET | Refills: 0 | Status: SHIPPED | OUTPATIENT
Start: 2022-12-06 | End: 2023-02-17

## 2022-12-06 NOTE — ASSESSMENT & PLAN NOTE
Chronic issue  Worsening  Has been elevated at home due to the recent loss of a loved one at 166/90s  Today it is 140/88  On lisinopril 10mg but has been taking 2 of them over the past week    Associated dizziness and nausea  Denies any vision loss

## 2022-12-06 NOTE — PROGRESS NOTES
"Subjective:     Chief Complaint   Patient presents with    Hypertension     Began Tuesday, after receiving new of death in the family, 165/100. On going headache, took two lisinopril      Nausea    Dizziness       HPI:   Karely presents today to discuss the following.    HTN (hypertension)  Chronic issue  Worsening  Has been elevated at home due to the recent loss of a loved one at 166/90s  Today it is 140/88  On lisinopril 10mg but has been taking 2 of them over the past week    Associated dizziness and nausea  Denies any vision loss    Past Medical History:   Diagnosis Date    Anesthesia     had some anxiety with rachel surgery    Arthritis     Dental disorder     upper and lower partials    Dyslipidemia     High cholesterol     Had lost some and improved with no meds.    HTN     Migraine with visual aura     Right knee pain 06/06/2019    mild and unable to bend completely.        Current Outpatient Medications Ordered in Epic   Medication Sig Dispense Refill    lisinopril (PRINIVIL) 40 MG tablet Take 1 Tablet by mouth every day. 90 Tablet 0    estrogens, conjugated (PREMARIN) 0.625 MG/GM Cream Apply thin layer to vaginal area twice weekly 30 g 3     No current [facility]-ordered facility-administered medications on file.       Allergies:  Latex and Pcn [penicillins]    Health Maintenance: Completed    ROS:  Gen: no fevers/chills, no changes in weight  Eyes: no changes in vision  Pulm: no sob, no cough  CV: no chest pain, no palpitations  GI: no nausea/vomiting, no diarrhea      Objective:     Exam:  BP (!) 140/88 (BP Location: Right arm, Patient Position: Sitting, BP Cuff Size: Adult)   Pulse (!) 54   Temp 36.4 °C (97.6 °F) (Temporal)   Ht 1.575 m (5' 2\")   Wt 73.5 kg (162 lb)   SpO2 97%   BMI 29.63 kg/m²  Body mass index is 29.63 kg/m².      Constitutional: Alert, no distress, well-groomed.  Skin: Warm, dry, good turgor, no rashes in visible areas.  Eye: Equal, round and reactive, conjunctiva clear, lids " Rx Refill Note  Requested Prescriptions     Refused Prescriptions Disp Refills   • SITagliptin (JANUVIA) 100 MG tablet 90 tablet 3     Sig: Take 1 tablet by mouth Daily.      Last office visit with prescribing clinician: 7/12/2022      Next office visit with prescribing clinician: 11/5/2022            Carlyn Crenshaw LPN  11/07/22, 10:06 EST     normal.  ENMT: Lips without lesions, good dentition, moist mucous membranes.  Neck: Trachea midline, no masses, no thyromegaly.  Respiratory: Unlabored respiratory effort, no cough.  MSK: Normal gait, moves all extremities.  Neuro: Grossly non-focal.   Psych: Alert and oriented x3, normal affect and mood.        Assessment & Plan:     52 y.o. female with the following -     1. Essential hypertension  Chronic, worsening condition.  Escalating due to recent's stressful events.  At this time I would like to control her blood pressure better by increasing lisinopril to 40 mg daily.  Continue with blood pressure monitoring and I will reassess in 6 weeks.  Strict emergency room precautions were given.  - lisinopril (PRINIVIL) 40 MG tablet; Take 1 Tablet by mouth every day.  Dispense: 90 Tablet; Refill: 0      Return in about 6 weeks (around 1/17/2023).          Please note that this dictation was created using voice recognition software. I have made every reasonable attempt to correct obvious errors, but I expect that there are errors of grammar and possibly content that I did not discover before finalizing the note.

## 2023-02-14 SDOH — ECONOMIC STABILITY: TRANSPORTATION INSECURITY
IN THE PAST 12 MONTHS, HAS THE LACK OF TRANSPORTATION KEPT YOU FROM MEDICAL APPOINTMENTS OR FROM GETTING MEDICATIONS?: NO

## 2023-02-14 SDOH — ECONOMIC STABILITY: HOUSING INSECURITY: IN THE LAST 12 MONTHS, HOW MANY PLACES HAVE YOU LIVED?: 1

## 2023-02-14 SDOH — ECONOMIC STABILITY: TRANSPORTATION INSECURITY
IN THE PAST 12 MONTHS, HAS LACK OF TRANSPORTATION KEPT YOU FROM MEETINGS, WORK, OR FROM GETTING THINGS NEEDED FOR DAILY LIVING?: NO

## 2023-02-14 SDOH — ECONOMIC STABILITY: HOUSING INSECURITY
IN THE LAST 12 MONTHS, WAS THERE A TIME WHEN YOU DID NOT HAVE A STEADY PLACE TO SLEEP OR SLEPT IN A SHELTER (INCLUDING NOW)?: NO

## 2023-02-14 SDOH — ECONOMIC STABILITY: INCOME INSECURITY: IN THE LAST 12 MONTHS, WAS THERE A TIME WHEN YOU WERE NOT ABLE TO PAY THE MORTGAGE OR RENT ON TIME?: NO

## 2023-02-14 SDOH — ECONOMIC STABILITY: FOOD INSECURITY: WITHIN THE PAST 12 MONTHS, YOU WORRIED THAT YOUR FOOD WOULD RUN OUT BEFORE YOU GOT MONEY TO BUY MORE.: NEVER TRUE

## 2023-02-14 SDOH — ECONOMIC STABILITY: FOOD INSECURITY: WITHIN THE PAST 12 MONTHS, THE FOOD YOU BOUGHT JUST DIDN'T LAST AND YOU DIDN'T HAVE MONEY TO GET MORE.: NEVER TRUE

## 2023-02-14 SDOH — HEALTH STABILITY: PHYSICAL HEALTH: ON AVERAGE, HOW MANY MINUTES DO YOU ENGAGE IN EXERCISE AT THIS LEVEL?: 60 MIN

## 2023-02-14 SDOH — ECONOMIC STABILITY: INCOME INSECURITY: HOW HARD IS IT FOR YOU TO PAY FOR THE VERY BASICS LIKE FOOD, HOUSING, MEDICAL CARE, AND HEATING?: NOT HARD AT ALL

## 2023-02-14 SDOH — HEALTH STABILITY: MENTAL HEALTH
STRESS IS WHEN SOMEONE FEELS TENSE, NERVOUS, ANXIOUS, OR CAN'T SLEEP AT NIGHT BECAUSE THEIR MIND IS TROUBLED. HOW STRESSED ARE YOU?: RATHER MUCH

## 2023-02-14 SDOH — HEALTH STABILITY: PHYSICAL HEALTH: ON AVERAGE, HOW MANY DAYS PER WEEK DO YOU ENGAGE IN MODERATE TO STRENUOUS EXERCISE (LIKE A BRISK WALK)?: 4 DAYS

## 2023-02-14 SDOH — ECONOMIC STABILITY: TRANSPORTATION INSECURITY
IN THE PAST 12 MONTHS, HAS LACK OF RELIABLE TRANSPORTATION KEPT YOU FROM MEDICAL APPOINTMENTS, MEETINGS, WORK OR FROM GETTING THINGS NEEDED FOR DAILY LIVING?: NO

## 2023-02-14 ASSESSMENT — SOCIAL DETERMINANTS OF HEALTH (SDOH)
HOW OFTEN DO YOU HAVE SIX OR MORE DRINKS ON ONE OCCASION: NEVER
HOW OFTEN DO YOU ATTEND CHURCH OR RELIGIOUS SERVICES?: MORE THAN 4 TIMES PER YEAR
DO YOU BELONG TO ANY CLUBS OR ORGANIZATIONS SUCH AS CHURCH GROUPS UNIONS, FRATERNAL OR ATHLETIC GROUPS, OR SCHOOL GROUPS?: YES
HOW OFTEN DO YOU HAVE A DRINK CONTAINING ALCOHOL: 2-4 TIMES A MONTH
IN A TYPICAL WEEK, HOW MANY TIMES DO YOU TALK ON THE PHONE WITH FAMILY, FRIENDS, OR NEIGHBORS?: MORE THAN THREE TIMES A WEEK
HOW HARD IS IT FOR YOU TO PAY FOR THE VERY BASICS LIKE FOOD, HOUSING, MEDICAL CARE, AND HEATING?: NOT HARD AT ALL
IN A TYPICAL WEEK, HOW MANY TIMES DO YOU TALK ON THE PHONE WITH FAMILY, FRIENDS, OR NEIGHBORS?: MORE THAN THREE TIMES A WEEK
HOW OFTEN DO YOU GET TOGETHER WITH FRIENDS OR RELATIVES?: ONCE A WEEK
HOW OFTEN DO YOU ATTEND CHURCH OR RELIGIOUS SERVICES?: MORE THAN 4 TIMES PER YEAR
HOW MANY DRINKS CONTAINING ALCOHOL DO YOU HAVE ON A TYPICAL DAY WHEN YOU ARE DRINKING: 3 OR 4
WITHIN THE PAST 12 MONTHS, YOU WORRIED THAT YOUR FOOD WOULD RUN OUT BEFORE YOU GOT THE MONEY TO BUY MORE: NEVER TRUE
HOW OFTEN DO YOU GET TOGETHER WITH FRIENDS OR RELATIVES?: ONCE A WEEK
DO YOU BELONG TO ANY CLUBS OR ORGANIZATIONS SUCH AS CHURCH GROUPS UNIONS, FRATERNAL OR ATHLETIC GROUPS, OR SCHOOL GROUPS?: YES
HOW OFTEN DO YOU ATTENT MEETINGS OF THE CLUB OR ORGANIZATION YOU BELONG TO?: MORE THAN 4 TIMES PER YEAR
HOW OFTEN DO YOU ATTENT MEETINGS OF THE CLUB OR ORGANIZATION YOU BELONG TO?: MORE THAN 4 TIMES PER YEAR

## 2023-02-14 ASSESSMENT — LIFESTYLE VARIABLES
HOW OFTEN DO YOU HAVE A DRINK CONTAINING ALCOHOL: 2-4 TIMES A MONTH
AUDIT-C TOTAL SCORE: 3
HOW MANY STANDARD DRINKS CONTAINING ALCOHOL DO YOU HAVE ON A TYPICAL DAY: 3 OR 4
HOW OFTEN DO YOU HAVE SIX OR MORE DRINKS ON ONE OCCASION: NEVER
SKIP TO QUESTIONS 9-10: 0

## 2023-02-17 ENCOUNTER — OFFICE VISIT (OUTPATIENT)
Dept: MEDICAL GROUP | Facility: PHYSICIAN GROUP | Age: 53
End: 2023-02-17
Payer: COMMERCIAL

## 2023-02-17 VITALS
DIASTOLIC BLOOD PRESSURE: 60 MMHG | TEMPERATURE: 97.1 F | HEART RATE: 66 BPM | HEIGHT: 62 IN | SYSTOLIC BLOOD PRESSURE: 124 MMHG | BODY MASS INDEX: 29.63 KG/M2 | OXYGEN SATURATION: 96 % | WEIGHT: 161 LBS

## 2023-02-17 DIAGNOSIS — N89.8 VAGINAL DRYNESS: ICD-10-CM

## 2023-02-17 DIAGNOSIS — I10 PRIMARY HYPERTENSION: ICD-10-CM

## 2023-02-17 PROCEDURE — 99214 OFFICE O/P EST MOD 30 MIN: CPT | Performed by: FAMILY MEDICINE

## 2023-02-17 RX ORDER — LISINOPRIL 10 MG/1
10 TABLET ORAL DAILY
COMMUNITY
Start: 2023-02-05 | End: 2023-02-17 | Stop reason: SDUPTHER

## 2023-02-17 RX ORDER — METHYLPREDNISOLONE 4 MG/1
TABLET ORAL
COMMUNITY
Start: 2022-11-21 | End: 2023-02-17

## 2023-02-17 RX ORDER — LISINOPRIL 10 MG/1
10 TABLET ORAL DAILY
Qty: 90 TABLET | Refills: 1 | Status: SHIPPED | OUTPATIENT
Start: 2023-02-17 | End: 2024-01-11 | Stop reason: SDUPTHER

## 2023-02-17 RX ORDER — CONJUGATED ESTROGENS 0.62 MG/G
CREAM VAGINAL
Qty: 30 G | Refills: 3 | Status: SHIPPED | OUTPATIENT
Start: 2023-02-17

## 2023-02-17 ASSESSMENT — PATIENT HEALTH QUESTIONNAIRE - PHQ9: CLINICAL INTERPRETATION OF PHQ2 SCORE: 0

## 2023-02-17 NOTE — PROGRESS NOTES
"Subjective:     Chief Complaint   Patient presents with    Establish Care       HPI:   Karely presents today to discuss the following.    HTN (hypertension)  Chronic issue  Stable now with lisinopril 10mg  Bp at home is stable  BP today is normal     Past Medical History:   Diagnosis Date    Anesthesia     had some anxiety with rachel surgery    Arthritis     Dental disorder     upper and lower partials    Dyslipidemia     High cholesterol     Had lost some and improved with no meds.    HTN     Migraine with visual aura     Right knee pain 06/06/2019    mild and unable to bend completely.        Current Outpatient Medications Ordered in Epic   Medication Sig Dispense Refill    lisinopril (PRINIVIL) 10 MG Tab Take 1 Tablet by mouth every day. 90 Tablet 1    estrogens, conjugated (PREMARIN) 0.625 MG/GM Cream Apply thin layer to vaginal area twice weekly 30 g 3     No current [facility]-ordered facility-administered medications on file.       Allergies:  Latex and Pcn [penicillins]    Health Maintenance: Completed    ROS:  Gen: no fevers/chills, no changes in weight  Eyes: no changes in vision  Pulm: no sob, no cough  CV: no chest pain, no palpitations  GI: no nausea/vomiting, no diarrhea      Objective:     Exam:  /60 (BP Location: Left arm, Patient Position: Sitting, BP Cuff Size: Adult)   Pulse 66   Temp 36.2 °C (97.1 °F) (Temporal)   Ht 1.575 m (5' 2\")   Wt 73 kg (161 lb)   SpO2 96%   BMI 29.45 kg/m²  Body mass index is 29.45 kg/m².      Constitutional: Alert, no distress, well-groomed.  Skin: Warm, dry, good turgor, no rashes in visible areas.  Eye: Equal, round and reactive, conjunctiva clear, lids normal.  ENMT: Lips without lesions, good dentition, moist mucous membranes.  Neck: Trachea midline, no masses, no thyromegaly.  Respiratory: Unlabored respiratory effort, no cough.  MSK: Normal gait, moves all extremities.  Neuro: Grossly non-focal.   Psych: Alert and oriented x3, normal affect and " mood.        Assessment & Plan:     52 y.o. female with the following -     1. Primary hypertension  Chronic, stable condition.  Continue with lisinopril 10 mg daily.  Refill provided today.    2. Vaginal dryness  Chronic condition.  Continue with estrogen cream.  - estrogens, conjugated (PREMARIN) 0.625 MG/GM Cream; Apply thin layer to vaginal area twice weekly  Dispense: 30 g; Refill: 3      No follow-ups on file.          Please note that this dictation was created using voice recognition software. I have made every reasonable attempt to correct obvious errors, but I expect that there are errors of grammar and possibly content that I did not discover before finalizing the note.

## 2023-05-26 ENCOUNTER — OFFICE VISIT (OUTPATIENT)
Dept: MEDICAL GROUP | Facility: PHYSICIAN GROUP | Age: 53
End: 2023-05-26
Payer: COMMERCIAL

## 2023-05-26 VITALS
HEIGHT: 62 IN | BODY MASS INDEX: 30.62 KG/M2 | SYSTOLIC BLOOD PRESSURE: 118 MMHG | DIASTOLIC BLOOD PRESSURE: 76 MMHG | HEART RATE: 74 BPM | TEMPERATURE: 98.4 F | WEIGHT: 166.4 LBS | OXYGEN SATURATION: 92 %

## 2023-05-26 DIAGNOSIS — J02.9 SORE THROAT: ICD-10-CM

## 2023-05-26 DIAGNOSIS — Z91.09 ENVIRONMENTAL ALLERGIES: ICD-10-CM

## 2023-05-26 PROCEDURE — 3078F DIAST BP <80 MM HG: CPT | Performed by: FAMILY MEDICINE

## 2023-05-26 PROCEDURE — 3074F SYST BP LT 130 MM HG: CPT | Performed by: FAMILY MEDICINE

## 2023-05-26 PROCEDURE — 99214 OFFICE O/P EST MOD 30 MIN: CPT | Performed by: FAMILY MEDICINE

## 2023-05-27 NOTE — PROGRESS NOTES
"Subjective:     Chief Complaint   Patient presents with    Pharyngitis     Tonsillitis        HPI:   Karely presents today to discuss the following.    Sore throat  Recurrent issue  Sore throat over the past 2 weeks  Felt like her tonsils were swollen  Has been doing salt gargles       No sick contacts  Neg covid at home  Getting better     Her seasonal allergies are bad    Past Medical History:   Diagnosis Date    Anesthesia     had some anxiety with rachel surgery    Arthritis     Dental disorder     upper and lower partials    Dyslipidemia     High cholesterol     Had lost some and improved with no meds.    HTN     Migraine with visual aura     Right knee pain 06/06/2019    mild and unable to bend completely.        Current Outpatient Medications Ordered in Epic   Medication Sig Dispense Refill    lisinopril (PRINIVIL) 10 MG Tab Take 1 Tablet by mouth every day. 90 Tablet 1    estrogens, conjugated (PREMARIN) 0.625 MG/GM Cream Apply thin layer to vaginal area twice weekly 30 g 3     No current [facility]-ordered facility-administered medications on file.       Allergies:  Latex and Pcn [penicillins]    Health Maintenance: Completed    ROS:  Gen: no fevers/chills, no changes in weight  Eyes: no changes in vision  Pulm: no sob, no cough  CV: no chest pain, no palpitations  GI: no nausea/vomiting, no diarrhea      Objective:     Exam:  /76 (BP Location: Left arm, Patient Position: Sitting, BP Cuff Size: Adult)   Pulse 74   Temp 36.9 °C (98.4 °F) (Temporal)   Ht 1.575 m (5' 2\")   Wt 75.5 kg (166 lb 6.4 oz)   SpO2 92%   BMI 30.43 kg/m²  Body mass index is 30.43 kg/m².    Gen: Alert and oriented, No apparent distress.  HENT: TMs are clear. Oropharynx is mild erythema w/o exudates. No tonsil hypertrophy.  Neck is supple without cervical lymphadenopathy. No JVD.   Eyes: PERRLA  Ext: No clubbing, cyanosis, edema.  Skin: no rash, lesions or ulcers  Neuro: Moves all extremities.  Psych: AAOx3          Assessment & Plan: "     52 y.o. female with the following -     1. Sore throat  2. Environmental allergies  Acute on chronic issue. Having sore throat likely secondary to seasonal allergies. No tonsillar hypertrophy on exam. At this time I would like to do a trial of Azelastine spray and monitor for improvement. Return precautions given. Will refer to ENT if symptoms do not improve.       No follow-ups on file.          Please note that this dictation was created using voice recognition software. I have made every reasonable attempt to correct obvious errors, but I expect that there are errors of grammar and possibly content that I did not discover before finalizing the note.

## 2023-06-26 RX ORDER — AZELASTINE HYDROCHLORIDE 137 UG/1
SPRAY, METERED NASAL
Qty: 30 ML | Refills: 3 | Status: SHIPPED | OUTPATIENT
Start: 2023-06-26

## 2023-07-20 ENCOUNTER — OFFICE VISIT (OUTPATIENT)
Dept: MEDICAL GROUP | Facility: PHYSICIAN GROUP | Age: 53
End: 2023-07-20
Payer: COMMERCIAL

## 2023-07-20 VITALS
SYSTOLIC BLOOD PRESSURE: 112 MMHG | DIASTOLIC BLOOD PRESSURE: 72 MMHG | TEMPERATURE: 98.2 F | HEART RATE: 58 BPM | HEIGHT: 62 IN | OXYGEN SATURATION: 96 % | BODY MASS INDEX: 31.1 KG/M2 | WEIGHT: 169 LBS

## 2023-07-20 DIAGNOSIS — M72.2 PLANTAR FASCIITIS: ICD-10-CM

## 2023-07-20 DIAGNOSIS — M79.661 BILATERAL CALF PAIN: ICD-10-CM

## 2023-07-20 DIAGNOSIS — I83.813 VARICOSE VEINS OF BOTH LOWER EXTREMITIES WITH PAIN: ICD-10-CM

## 2023-07-20 DIAGNOSIS — M79.662 BILATERAL CALF PAIN: ICD-10-CM

## 2023-07-20 PROCEDURE — 3078F DIAST BP <80 MM HG: CPT | Performed by: FAMILY MEDICINE

## 2023-07-20 PROCEDURE — 3074F SYST BP LT 130 MM HG: CPT | Performed by: FAMILY MEDICINE

## 2023-07-20 PROCEDURE — 99214 OFFICE O/P EST MOD 30 MIN: CPT | Performed by: FAMILY MEDICINE

## 2023-07-20 NOTE — PROGRESS NOTES
"Subjective:     Chief Complaint   Patient presents with    Leg Pain     Pinching pain, numb, sore, swollen both legs. 1x month        HPI:   Karely presents today to discuss the following.    Bilateral calf pain  Chronic issue  Having b/l calf pain  Does have varicose veins and appear swollen  Had recent travel    Plantar fasciitis  Chronic issue  Flaring condition   Sees podiatry    Past Medical History:   Diagnosis Date    Anesthesia     had some anxiety with rachel surgery    Arthritis     Dental disorder     upper and lower partials    Dyslipidemia     High cholesterol     Had lost some and improved with no meds.    HTN     Migraine with visual aura     Right knee pain 06/06/2019    mild and unable to bend completely.        Current Outpatient Medications Ordered in Epic   Medication Sig Dispense Refill    Azelastine HCl 137 MCG/SPRAY Solution ADMINISTER 1 SPRAY INTO AFFECTED NOSTRIL(S) 2 TIMES A DAY. 30 mL 3    lisinopril (PRINIVIL) 10 MG Tab Take 1 Tablet by mouth every day. 90 Tablet 1    estrogens, conjugated (PREMARIN) 0.625 MG/GM Cream Apply thin layer to vaginal area twice weekly 30 g 3     No current [facility]-ordered facility-administered medications on file.       Allergies:  Latex and Pcn [penicillins]    Health Maintenance: Completed    ROS:  Gen: no fevers/chills, no changes in weight  Eyes: no changes in vision  Pulm: no sob, no cough  CV: no chest pain, no palpitations  GI: no nausea/vomiting, no diarrhea      Objective:     Exam:  /72 (BP Location: Left arm, Patient Position: Sitting, BP Cuff Size: Adult)   Pulse (!) 58   Temp 36.8 °C (98.2 °F) (Temporal)   Ht 1.575 m (5' 2\")   Wt 76.7 kg (169 lb)   SpO2 96%   BMI 30.91 kg/m²  Body mass index is 30.91 kg/m².    Constitutional: Alert, no distress, well-groomed.  Skin: Warm, dry, good turgor, no rashes in visible areas.  Eye: Equal, round and reactive, conjunctiva clear, lids normal.  ENMT: Lips without lesions, good dentition, moist mucous " membranes.  Neck: Trachea midline, no masses, no thyromegaly.  Respiratory: Unlabored respiratory effort, no cough.  MSK: Normal gait, moves all extremities.  Mild leg swelling.  There is calf tenderness to palpation.  Evidence of engorged varicose veins.  Neuro: Grossly non-focal.   Psych: Alert and oriented x3, normal affect and mood.        Assessment & Plan:     52 y.o. female with the following -     1. Bilateral calf pain  New problem.  Unknown etiology and prognosis.  We will proceed with ultrasonography to rule out DVT.  I will also refer to vein clinic for evaluation of her varicose veins.  Leg elevation and compression recommended.  - US-EXTREMITY VENOUS LOWER BILAT; Future  - Referral to Other    2. Plantar fasciitis  Chronic, worsening condition.  Recommend follow-up with podiatry.    3. Varicose veins of both lower extremities with pain  - Referral to Other      No follow-ups on file.          Please note that this dictation was created using voice recognition software. I have made every reasonable attempt to correct obvious errors, but I expect that there are errors of grammar and possibly content that I did not discover before finalizing the note.

## 2023-07-20 NOTE — ASSESSMENT & PLAN NOTE
Chronic issue  Having b/l calf pain  Does have varicose veins and appear swollen  Had recent travel

## 2023-08-11 ENCOUNTER — DOCUMENTATION (OUTPATIENT)
Dept: HEALTH INFORMATION MANAGEMENT | Facility: OTHER | Age: 53
End: 2023-08-11

## 2024-01-11 RX ORDER — LISINOPRIL 10 MG/1
10 TABLET ORAL DAILY
Qty: 90 TABLET | Refills: 0 | Status: SHIPPED | OUTPATIENT
Start: 2024-01-11

## 2024-03-18 ENCOUNTER — APPOINTMENT (OUTPATIENT)
Dept: URGENT CARE | Facility: PHYSICIAN GROUP | Age: 54
End: 2024-03-18

## 2024-04-08 RX ORDER — LISINOPRIL 10 MG/1
10 TABLET ORAL DAILY
Qty: 30 TABLET | Refills: 2 | Status: SHIPPED | OUTPATIENT
Start: 2024-04-08

## 2024-04-08 NOTE — TELEPHONE ENCOUNTER
Received request via: Pharmacy    Was the patient seen in the last year in this department? Yes    Does the patient have an active prescription (recently filled or refills available) for medication(s) requested? No    Pharmacy Name:   [facility]/pharmacy #[id number] - [place], [place] - [address] AT [address]       Does the patient have half-way Plus and need 100 day supply (blood pressure, diabetes and cholesterol meds only)? Patient does not have SCP